# Patient Record
Sex: FEMALE | Race: WHITE | NOT HISPANIC OR LATINO | Employment: PART TIME | URBAN - METROPOLITAN AREA
[De-identification: names, ages, dates, MRNs, and addresses within clinical notes are randomized per-mention and may not be internally consistent; named-entity substitution may affect disease eponyms.]

---

## 2017-06-19 RX ORDER — ALPRAZOLAM 0.5 MG/1
TABLET ORAL
Qty: 30 TABLET | Refills: 3 | Status: SHIPPED | OUTPATIENT
Start: 2017-06-19

## 2018-01-18 ENCOUNTER — OFFICE VISIT (OUTPATIENT)
Dept: OBSTETRICS AND GYNECOLOGY | Facility: CLINIC | Age: 48
End: 2018-01-18
Payer: COMMERCIAL

## 2018-01-18 VITALS — BODY MASS INDEX: 30.72 KG/M2 | WEIGHT: 195.75 LBS | HEIGHT: 67 IN

## 2018-01-18 DIAGNOSIS — R10.2 PELVIC PAIN: Primary | ICD-10-CM

## 2018-01-18 PROCEDURE — 99999 PR PBB SHADOW E&M-EST. PATIENT-LVL II: CPT | Mod: PBBFAC,,, | Performed by: SPECIALIST

## 2018-01-18 PROCEDURE — 99203 OFFICE O/P NEW LOW 30 MIN: CPT | Mod: S$GLB,,, | Performed by: SPECIALIST

## 2018-01-18 RX ORDER — MELOXICAM 15 MG/1
TABLET ORAL
COMMUNITY
Start: 2017-12-18 | End: 2022-11-18

## 2018-01-18 RX ORDER — PANTOPRAZOLE SODIUM 40 MG/1
TABLET, DELAYED RELEASE ORAL
COMMUNITY
Start: 2018-01-04

## 2018-01-22 NOTE — PROGRESS NOTES
48 yo WF presents for annual gyn evaluation. Pt complains intermittent lower abdominal pain and pelvic pain. Pt states h/o of ovarian cysts. Pt has undergone hysterectomy. Pt denies weight loss/gain, melena, n/v, constipation/diarrhea.    Past Medical History:   Diagnosis Date    Abnormal Pap smear of cervix 2005    dysplasia/ cryo/ laser    Allergy     Anxiety     Multiple sclerosis     RLS (restless legs syndrome)        Past Surgical History:   Procedure Laterality Date    had a laser surgery on the cervix      HYSTERECTOMY         Family History   Problem Relation Age of Onset    Heart disease Mother     Heart attack Mother     Heart attack Father     Hypertension Father     Diabetes Sister     Heart disease Maternal Grandmother     Heart attack Maternal Grandmother     Aneurysm Maternal Grandfather     Heart disease Paternal Grandmother     Heart attack Paternal Grandfather     Breast cancer Paternal Aunt 65    Ovarian cancer Neg Hx        Social History     Social History    Marital status:      Spouse name: N/A    Number of children: 3    Years of education: N/A     Occupational History          Social History Main Topics    Smoking status: Former Smoker     Types: Cigarettes    Smokeless tobacco: Never Used    Alcohol use No    Drug use: No    Sexual activity: Yes     Partners: Male     Birth control/ protection: None     Other Topics Concern    None     Social History Narrative    None       Current Outpatient Prescriptions   Medication Sig Dispense Refill    alprazolam (XANAX) 0.5 MG tablet TAKE 1 TABLET BY MOUTH EVERY DAY AS NEEDED 30 tablet 3    cetirizine (ZYRTEC) 10 MG tablet Take 10 mg by mouth once daily.      fluticasone (FLONASE) 50 mcg/actuation nasal spray 1 spray by Each Nare route once daily. 1 Bottle 0    ibuprofen (ADVIL,MOTRIN) 100 MG tablet Take 100 mg by mouth every 6 (six) hours as needed for Temperature greater than (PRN When Headache  Appears).      pramipexole (MIRAPEX) 0.125 MG tablet Take 1 tablet (0.125 mg total) by mouth every evening. 30 tablet 6    REBIF REBIDOSE 8.8mcg/0.2mL-22 mcg/0.5mL (6) PnIj       meloxicam (MOBIC) 15 MG tablet       pantoprazole (PROTONIX) 40 MG tablet        No current facility-administered medications for this visit.        Review of patient's allergies indicates:   Allergen Reactions    Iodine and iodide containing products Nausea And Vomiting       Review of System:   General: no chills, fever, night sweats, weight gain or weight loss  Psychological: no depression or suicidal ideation  Breasts: no new or changing breast lumps, nipple discharge or masses.  Respiratory: no cough, shortness of breath, or wheezing  Cardiovascular: no chest pain or dyspnea on exertion  Gastrointestinal: no abdominal pain, change in bowel habits, or black or bloody stools  Genito-Urinary: no incontinence, urinary frequency/urgency or vulvar/vaginal symptoms, POSITIVE pelvic pain   Musculoskeletal: no gait disturbance or muscular weakness    PE:   APPEARANCE: Well nourished, well developed, in no acute distress.  NECK: Neck symmetric without masses or thyromegaly.  NODES: No inguinal lymph node enlargement.  ABDOMEN: Soft. No tenderness or masses. No hepatosplenomegaly. No hernias.  BREASTS: Symmetrical, no skin changes or visible lesions. No palpable masses, nipple discharge or adenopathy bilaterally.  PELVIC: Normal external female genitalia without lesions. Normal hair distribution. Adequate perineal body, normal urethral meatus. Vagina moist and well rugated without lesions or discharge. No significant cystocele or rectocele. Uterus and cervix surgically absent. Bimanual exam revealed no masses, BUT POSITIVE TENDERNESS TO DEEP PALP SUPRAPUBICALLY.    I discussed possible etiologies of LAP and pelvic pain. Possible adhesions etc.  I rec pelvic u/s and will obtain and review  Possible laparoscope pending the above  results  Discussed BSE monthly as well as mammo screening  Will follow

## 2018-01-29 RX ORDER — ALPRAZOLAM 0.5 MG/1
TABLET ORAL
Qty: 30 TABLET | OUTPATIENT
Start: 2018-01-29

## 2018-01-30 NOTE — TELEPHONE ENCOUNTER
ASIYA message sent to the pt to advise that she needs an OV before any further refills can be provided.

## 2020-10-08 ENCOUNTER — TELEPHONE (OUTPATIENT)
Dept: PULMONOLOGY | Facility: CLINIC | Age: 50
End: 2020-10-08

## 2020-10-08 ENCOUNTER — OFFICE VISIT (OUTPATIENT)
Dept: PULMONOLOGY | Facility: CLINIC | Age: 50
End: 2020-10-08
Payer: COMMERCIAL

## 2020-10-08 ENCOUNTER — HOSPITAL ENCOUNTER (OUTPATIENT)
Dept: RADIOLOGY | Facility: HOSPITAL | Age: 50
Discharge: HOME | End: 2020-10-08
Attending: NURSE PRACTITIONER
Payer: COMMERCIAL

## 2020-10-08 VITALS
BODY MASS INDEX: 32.65 KG/M2 | HEART RATE: 75 BPM | WEIGHT: 208.44 LBS | SYSTOLIC BLOOD PRESSURE: 152 MMHG | OXYGEN SATURATION: 98 % | DIASTOLIC BLOOD PRESSURE: 99 MMHG

## 2020-10-08 DIAGNOSIS — J34.89 NASAL CONGESTION WITH RHINORRHEA: ICD-10-CM

## 2020-10-08 DIAGNOSIS — R05.3 PERSISTENT COUGH: Primary | ICD-10-CM

## 2020-10-08 DIAGNOSIS — R09.81 NASAL CONGESTION WITH RHINORRHEA: ICD-10-CM

## 2020-10-08 DIAGNOSIS — R05.3 PERSISTENT COUGH: ICD-10-CM

## 2020-10-08 DIAGNOSIS — J45.51 SEVERE PERSISTENT ASTHMA WITH ACUTE EXACERBATION: ICD-10-CM

## 2020-10-08 PROCEDURE — 71046 X-RAY EXAM CHEST 2 VIEWS: CPT | Mod: TC,FY

## 2020-10-08 PROCEDURE — 3080F DIAST BP >= 90 MM HG: CPT | Mod: CPTII,S$GLB,, | Performed by: NURSE PRACTITIONER

## 2020-10-08 PROCEDURE — 99204 OFFICE O/P NEW MOD 45 MIN: CPT | Mod: S$GLB,,, | Performed by: NURSE PRACTITIONER

## 2020-10-08 PROCEDURE — 3077F SYST BP >= 140 MM HG: CPT | Mod: CPTII,S$GLB,, | Performed by: NURSE PRACTITIONER

## 2020-10-08 PROCEDURE — 3077F PR MOST RECENT SYSTOLIC BLOOD PRESSURE >= 140 MM HG: ICD-10-PCS | Mod: CPTII,S$GLB,, | Performed by: NURSE PRACTITIONER

## 2020-10-08 PROCEDURE — 99999 PR PBB SHADOW E&M-EST. PATIENT-LVL IV: ICD-10-PCS | Mod: PBBFAC,,, | Performed by: NURSE PRACTITIONER

## 2020-10-08 PROCEDURE — 3008F PR BODY MASS INDEX (BMI) DOCUMENTED: ICD-10-PCS | Mod: CPTII,S$GLB,, | Performed by: NURSE PRACTITIONER

## 2020-10-08 PROCEDURE — 99204 PR OFFICE/OUTPT VISIT, NEW, LEVL IV, 45-59 MIN: ICD-10-PCS | Mod: S$GLB,,, | Performed by: NURSE PRACTITIONER

## 2020-10-08 PROCEDURE — 3080F PR MOST RECENT DIASTOLIC BLOOD PRESSURE >= 90 MM HG: ICD-10-PCS | Mod: CPTII,S$GLB,, | Performed by: NURSE PRACTITIONER

## 2020-10-08 PROCEDURE — 71046 X-RAY EXAM CHEST 2 VIEWS: CPT | Mod: 26,,, | Performed by: RADIOLOGY

## 2020-10-08 PROCEDURE — 3008F BODY MASS INDEX DOCD: CPT | Mod: CPTII,S$GLB,, | Performed by: NURSE PRACTITIONER

## 2020-10-08 PROCEDURE — 99999 PR PBB SHADOW E&M-EST. PATIENT-LVL IV: CPT | Mod: PBBFAC,,, | Performed by: NURSE PRACTITIONER

## 2020-10-08 PROCEDURE — 71046 XR CHEST PA AND LATERAL: ICD-10-PCS | Mod: 26,,, | Performed by: RADIOLOGY

## 2020-10-08 RX ORDER — ALBUTEROL SULFATE 0.83 MG/ML
2.5 SOLUTION RESPIRATORY (INHALATION) EVERY 6 HOURS PRN
Qty: 120 ML | Refills: 2 | Status: SHIPPED | OUTPATIENT
Start: 2020-10-08 | End: 2021-06-30

## 2020-10-08 RX ORDER — DICLOFENAC SODIUM 10 MG/G
2 GEL TOPICAL 4 TIMES DAILY PRN
COMMUNITY
Start: 2020-09-02 | End: 2021-12-29

## 2020-10-08 RX ORDER — FLUTICASONE FUROATE AND VILANTEROL TRIFENATATE 100; 25 UG/1; UG/1
1 POWDER RESPIRATORY (INHALATION) DAILY
Qty: 60 EACH | Refills: 1 | Status: SHIPPED | OUTPATIENT
Start: 2020-10-08 | End: 2020-10-19

## 2020-10-08 RX ORDER — TIOTROPIUM BROMIDE 18 UG/1
CAPSULE ORAL; RESPIRATORY (INHALATION)
COMMUNITY
Start: 2020-09-17 | End: 2020-11-25 | Stop reason: ALTCHOICE

## 2020-10-08 RX ORDER — FAMOTIDINE 20 MG/1
20 TABLET, FILM COATED ORAL 2 TIMES DAILY
COMMUNITY
End: 2021-12-29

## 2020-10-08 RX ORDER — RABEPRAZOLE SODIUM 20 MG/1
TABLET, DELAYED RELEASE ORAL
COMMUNITY
Start: 2020-10-03 | End: 2021-12-29

## 2020-10-08 RX ORDER — ROPINIROLE 3 MG/1
TABLET, FILM COATED ORAL
COMMUNITY
End: 2021-12-29

## 2020-10-08 RX ORDER — CHOLECALCIFEROL (VITAMIN D3) 25 MCG
1000 TABLET ORAL DAILY
COMMUNITY

## 2020-10-08 RX ORDER — ESOMEPRAZOLE MAGNESIUM 40 MG/1
CAPSULE, DELAYED RELEASE ORAL
COMMUNITY
End: 2021-12-29

## 2020-10-08 RX ORDER — VALSARTAN 80 MG/1
80 TABLET ORAL
COMMUNITY
Start: 2020-08-06 | End: 2022-11-18

## 2020-10-08 RX ORDER — PREDNISONE 10 MG/1
TABLET ORAL
Qty: 18 TABLET | Refills: 0 | Status: SHIPPED | OUTPATIENT
Start: 2020-10-08 | End: 2021-02-09

## 2020-10-08 RX ORDER — ALBUTEROL SULFATE 90 UG/1
2 AEROSOL, METERED RESPIRATORY (INHALATION) EVERY 6 HOURS PRN
COMMUNITY
Start: 2020-01-06 | End: 2021-01-05

## 2020-10-08 RX ORDER — SUCRALFATE 1 G/1
TABLET ORAL
COMMUNITY
Start: 2020-07-13 | End: 2021-12-29

## 2020-10-08 RX ORDER — FLUTICASONE PROPIONATE 50 MCG
1 SPRAY, SUSPENSION (ML) NASAL DAILY
Qty: 16 G | Refills: 11 | Status: SHIPPED | OUTPATIENT
Start: 2020-10-08 | End: 2021-12-29

## 2020-10-08 NOTE — PROGRESS NOTES
10/8/2020    Maggie Velazquez  New Patient Consult    Chief Complaint   Patient presents with    Referral To Pulmonary     Dr. Tabby MD    Cough     dry cough since Jan. - had PFT - normal       HPI:  10/8/20- referred by PCP for dry cough, onset 10 months, daily, did not improve with reflux medication, states recurrent problem that occurs for few weeks yearly states seasonal. Worse after eating and when laying down at night. No nocturnal arousals. States she can not laugh without coughing. Worse when around strong smells such as hairspray. PCP started on Spiriva and albuterol with no benefit. States humidifier improved.   SOB- onset 10 months, with exertion, improved when she [place] the mountains, associated with chest tightness. Has occasional wheeze only at night.     Social Hx: Lives with . 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  no known Asbestosis exposure, Smoking Hx: quit 2004, 19 pack years, exposed to second hand smoke as child  Family Hx: no Lung Cancer, Aunt COPD, no Asthma  Medical Hx: no previous pneumonia ; no previous shoulder/chest surgery; Dx MS 2016 followed [facility]rolog[facility]; dx IBS 2010        The chief compliant  problem is new to me  PFSH:  Past Medical History:   Diagnosis Date    Abnormal Pap smear of cervix 2005    dysplasia/ cryo/ laser    Allergy     Anxiety     Multiple sclerosis     RLS (restless legs syndrome)          Past Surgical History:   Procedure Laterality Date    had a laser surgery on the cervix      HYSTERECTOMY       Social History     Tobacco Use    Smoking status: Former Smoker     Types: Cigarettes    Smokeless tobacco: Never Used   Substance Use Topics    Alcohol use: No    Drug use: No     Family History   Problem Relation Age of Onset    Heart disease Mother     Heart attack Mother     Heart attack Father     Hypertension  Father     Diabetes Sister     Heart disease Maternal Grandmother     Heart attack Maternal Grandmother     Aneurysm Maternal Grandfather     Heart disease Paternal Grandmother     Heart attack Paternal Grandfather     Breast cancer Paternal Aunt 65    Ovarian cancer Neg Hx      Review of patient's allergies indicates:   Allergen Reactions    Iodine and iodide containing products Nausea And Vomiting     I have reviewed past medical, family, and social history. I have reviewed previous nurse notes.    Performance Status:The patient's activity level is no limits with regular activity.          Review of Systems   Constitutional: Negative for activity change, appetite change, chills, diaphoresis, fatigue, fever and unexpected weight change.   HENT: Negative for dental problem, postnasal drip, rhinorrhea, sinus pressure, sinus pain, sneezing, sore throat, trouble swallowing and voice change.  Positive for sinus pressure  Respiratory: Negative for apnea, cough, chest tightness, shortness of breath, wheezing and stridor.  Positive cough, chest tightness, wheeze, shortness of breath  Cardiovascular: Negative for chest pain, and leg swelling. Positive palpitations  Gastrointestinal: Negative for abdominal distention, abdominal pain, constipation and nausea. Positive for chronic IBS symptoms  Musculoskeletal: Negative for gait problem, myalgias and neck pain.   Skin: Negative for color change and pallor.   Allergic/Immunologic: Negative for environmental allergies and food allergies.   Neurological: Negative for speech difficulty, weakness, light-headedness, numbness and headaches. Positive for dizziness when laying flat  Hematological: Negative for adenopathy. Does not bruise/bleed easily.   Psychiatric/Behavioral: Negative for dysphoric mood and sleep disturbance. The patient is not nervous/anxious.           Exam:Comprehensive exam done. BP (!) 152/99 (BP Location: Right arm, Patient Position: Sitting)   Pulse  75   Wt 94.6 kg (208 lb 7.1 oz)   LMP 03/10/2016   SpO2 98% Comment: on room air at rest  BMI 32.65 kg/m²   Exam included Vitals as listed  Constitutional:  oriented to person, place, and time.  appears well-developed. No distress.   Nose: Nose normal.   Mouth/Throat: Uvula is midline, oropharynx is clear and moist and mucous membranes are normal. No dental caries. No oropharyngeal exudate, posterior oropharyngeal edema, posterior oropharyngeal erythema or tonsillar abscesses.  M[place]atti (M) score 3  Eyes: Pupils are equal, round, and reactive to light.   Neck: No JVD present. No thyromegaly present.   Cardiovascular: Normal rate, regular rhythm and normal heart sounds. Exam reveals no gallop and no friction rub.   No murmur heard.  Pulmonary/Chest: Effort normal and breath sounds normal. No accessory muscle usage or stridor. No apnea and no tachypnea. No respiratory distress, decreased breath sounds, wheezes, rhonchi, rales, or tenderness.   Abdominal: Soft.  exhibits no mass. There is no tenderness. No hepatosplenomegaly, hernias and normoactive bowel sounds  Musculoskeletal: Normal range of motion. exhibits mild lower extremity edema.   Lymphadenopathy:    no cervical adenopathy.   no axillary adenopathy.   Neurological:  alert and oriented to person, place, and time. not disoriented.   Skin: Skin is warm and dry. Capillary refill takes less 2 sec. No cyanosis or erythema. No pallor. Nails show no clubbing.   Psychiatric: normal mood and affect. behavior is normal. Judgment and thought content normal.       Radiographs (ct chest and cxr) reviewed: was not done       Labs reviewed       Lab Results   Component Value Date    WBC 5.03 06/25/2020    RBC 4.59 06/25/2020    HGB 13.8 06/25/2020    HCT 41.3 06/25/2020    MCV 90 06/25/2020    MCH 30.1 06/25/2020    MCHC 33.4 06/25/2020    RDW 13.2 06/25/2020     06/25/2020    MPV 10.0 06/25/2020    GRAN 3.2 06/25/2020    GRAN 63.0 06/25/2020    LYMPH 1.3  06/25/2020    LYMPH 26.2 06/25/2020    MONO 0.4 06/25/2020    MONO 7.0 06/25/2020    EOS 0.2 06/25/2020    BASO 0.01 06/25/2020    EOSINOPHIL 3.2 06/25/2020    BASOPHIL 0.2 06/25/2020       PFT results reviewed  Pulmonary Functions Testing Results:  DATE OF STUDY:  01/06/2020     REFERRING PHYSICIAN:  Otto Anton M.D.     FINDINGS:  Spirometry shows an FVC of 3.25 or 93% of predicted and FEV1 of 2.51   or 87% of predicted for an FEV1/FVC ratio of 77.  Postbronchodilator maneuver   does not show significant change in these values.  Flow volume loop is provided.    There is noted impairment or early cessation of the inspiratory limb of the   flow volume loop.  This may suggest the presence of a variable extrathoracic   obstruction, but is a very effort dependent maneuver and as such should be   interpreted with caution.  Clinical correlation would be recommended.  Lung   volume showed total lung capacity of 5.36 or 96% of predicted.  Residual volume   of 2.11 or 106% of predicted.  Diffusion capacity is measured at 17.9 or 69% of   predicted.     CONCLUSION:  Spirometry does not show definitive evidence of obstructive lung   disease, although there is some abnormality of the inspiratory limb of the flow   volume loop as discussed above.  Lung volumes are within normal limits and there   is a mild decrease in the diffusion capacity.  Given the patient's history of   tobacco use, this may be seen with some early changes of emphysema.      Plan:  Clinical impression is resonably certain and repeated evaluation prn +/- follow up will be needed as below.    Maggie was seen today for referral to pulmonary and cough.    Diagnoses and all orders for this visit:    Persistent cough  -     X-Ray Chest PA And Lateral; Future  -     predniSONE (DELTASONE) 10 MG tablet; 1-2 pills daily for three days, repeat for cough  -     fluticasone propionate (FLONASE) 50 mcg/actuation nasal spray; 1 spray (50 mcg total) by Each Nostril  route once daily.    Severe persistent asthma with acute exacerbation  -     fluticasone furoate-vilanteroL (BREO ELLIPTA) 100-25 mcg/dose diskus inhaler; Inhale 1 puff into the lungs once daily. Controller  -     NEBULIZER FOR HOME USE  -     albuterol (PROVENTIL) 2.5 mg /3 mL (0.083 %) nebulizer solution; Take 3 mLs (2.5 mg total) by nebulization every 6 (six) hours as needed for Wheezing or Shortness of Breath. Rescue  -     predniSONE (DELTASONE) 10 MG tablet; 1-2 pills daily for three days, repeat for cough    Nasal congestion with rhinorrhea  -     fluticasone propionate (FLONASE) 50 mcg/actuation nasal spray; 1 spray (50 mcg total) by Each Nostril route once daily.        Follow up in about 4 weeks (around 11/5/2020), or if symptoms worsen or fail to improve.    Discussed with patient above for education the following:      Patient Instructions   You have a type of Asthma called Eosinophilic Asthma  Will start you on a therapy plan today  Breo 100 1 puff once a day every day, rinse mouth after using due to risk for thrush if mouth or tongue has white sores contact clinic    Continue Spiriva 2 puffs daily    If no improvement by next appointment will order Ct of chest to evaluate persistent cough    Continue flonase and zyrtec daily

## 2020-10-08 NOTE — TELEPHONE ENCOUNTER
Faxed orders to [facility]    ----- Message from Daja Macedow sent at 10/8/2020  1:21 PM CDT -----  Regarding: Nebulizer Order  Contact: pt  Type: Needs Medical Advice  Who Called:  pt  Symptoms (please be specific):    How long has patient had these symptoms:    Pharmacy name and phone #:  [facility] F[facility] [phone]  Best Call Back Number: [phone] [place]  Additional Information: Please send order to [facility]. She can't  nebulizer without it. Call pt when sent. Thanks!

## 2020-10-08 NOTE — LETTER
October 8, 2020      Otto Anton MD  [id number] [facility] [address]  [facility] [address]           [facility]  [address]  [place] [address]  Phone: [phone]  Fax: [phone]          Patient: Maggie Velazquez   MR Number: [id number]   YOB: 1970   Date of Visit: 10/8/2020       Dear Dr. Otto Anton:    Thank you for referring Maggie Velazquez to me for evaluation. Attached you will find relevant portions of my assessment and plan of care.    If you have questions, please do not hesitate to call me. I look forward to following Maggie Velazquez along with you.    Sincerely,    Kelsey Aguillon, AKIRA    Enclosure  CC:  No Recipients    If you would like to receive this communication electronically, please contact [email or web address] or [phone] to request more information on [facility] access.    For providers and/or their staff who would like to refer a patient to [facility], please contact us through our one-stop-shop provider referral line, [facility], at [phone].    If you feel you have received this communication in error or would no longer like to receive these types of communications, please e-mail [email or web address]

## 2020-10-08 NOTE — PATIENT INSTRUCTIONS
You have a type of Asthma called Eosinophilic Asthma  Will start you on a therapy plan today  Breo 100 1 puff once a day every day, rinse mouth after using due to risk for thrush if mouth or tongue has white sores contact clinic    Continue Spiriva 2 puffs daily    If no improvement by next appointment will order Ct of chest to evaluate persistent cough    Continue flonase and zyrtec daily

## 2020-10-19 ENCOUNTER — OFFICE VISIT (OUTPATIENT)
Dept: PULMONOLOGY | Facility: CLINIC | Age: 50
End: 2020-10-19
Payer: COMMERCIAL

## 2020-10-19 DIAGNOSIS — J45.51 SEVERE PERSISTENT ASTHMA WITH EXACERBATION: Primary | ICD-10-CM

## 2020-10-19 DIAGNOSIS — J01.90 ACUTE SINUSITIS, RECURRENCE NOT SPECIFIED, UNSPECIFIED LOCATION: ICD-10-CM

## 2020-10-19 DIAGNOSIS — R05.9 COUGH: ICD-10-CM

## 2020-10-19 PROCEDURE — 99213 PR OFFICE/OUTPT VISIT, EST, LEVL III, 20-29 MIN: ICD-10-PCS | Mod: 95,,, | Performed by: NURSE PRACTITIONER

## 2020-10-19 PROCEDURE — 99213 OFFICE O/P EST LOW 20 MIN: CPT | Mod: 95,,, | Performed by: NURSE PRACTITIONER

## 2020-10-19 RX ORDER — PREDNISONE 20 MG/1
20 TABLET ORAL DAILY
Qty: 20 TABLET | Refills: 1 | Status: SHIPPED | OUTPATIENT
Start: 2020-10-19 | End: 2021-02-25 | Stop reason: ALTCHOICE

## 2020-10-19 RX ORDER — MONTELUKAST SODIUM 10 MG/1
10 TABLET ORAL NIGHTLY
Qty: 30 TABLET | Refills: 11 | Status: SHIPPED | OUTPATIENT
Start: 2020-10-19 | End: 2020-11-18

## 2020-10-19 RX ORDER — CODEINE PHOSPHATE AND GUAIFENESIN 10; 100 MG/5ML; MG/5ML
5 SOLUTION ORAL EVERY 4 HOURS PRN
Qty: 300 ML | Refills: 0 | Status: SHIPPED | OUTPATIENT
Start: 2020-10-19 | End: 2020-10-29

## 2020-10-19 RX ORDER — OMEPRAZOLE 40 MG/1
CAPSULE, DELAYED RELEASE ORAL
COMMUNITY
Start: 2020-10-11 | End: 2021-06-02

## 2020-10-19 RX ORDER — AZITHROMYCIN 500 MG/1
500 TABLET, FILM COATED ORAL DAILY
Qty: 3 TABLET | Refills: 0 | Status: SHIPPED | OUTPATIENT
Start: 2020-10-19 | End: 2020-10-22

## 2020-10-19 RX ORDER — FLUTICASONE FUROATE AND VILANTEROL TRIFENATATE 200; 25 UG/1; UG/1
1 POWDER RESPIRATORY (INHALATION) DAILY
Qty: 1 EACH | Refills: 5 | Status: SHIPPED | OUTPATIENT
Start: 2020-10-19 | End: 2020-11-25 | Stop reason: ALTCHOICE

## 2020-10-19 NOTE — PROGRESS NOTES
Established Patient - Audio Only Telehealth Visit     The patient location is: [place], [place]  The chief complaint leading to consultation is: cough  Visit type: Virtual visit with audio only (telephone)  Total time spent with patient: 15 min       The reason for the audio only service rather than synchronous audio and video virtual visit was related to technical difficulties or patient preference/necessity.     Each patient to whom I provide medical services by telemedicine is:  (1) informed of the relationship between the physician and patient and the respective role of any other health care provider with respect to management of the patient; and (2) notified that they may decline to receive medical services by telemedicine and may withdraw from such care at any time. Patient verbally consented to receive this service via voice-only telephone call.            10/19/2020    Maggie Velazquez  Office Note    Chief Complaint   Patient presents with    Medication Refill     prednisone refill    Cough       HPI:  10/19/20- Cough- worsening, improved while on prednisone 20 mg daily for 3 days but returned shortly after finishing prescription, non productive but has small amount of clear mucous with coughing fits. constant tickle in back of throat.    Complaint of sore throat, no fever  Complaint of fatigue and SOB 5 days prior while walking outside for hours. Worse with exertion, Did notice improvement with albuterol rescue inhaler.       10/8/20- referred by PCP for dry cough, onset 10 months, daily, did not improve with reflux medication, states recurrent problem that occurs for few weeks yearly states seasonal. Worse after eating and when laying down at night. No nocturnal arousals. States she can not laugh without coughing. Worse when around strong smells such as hairspray. PCP started on Spiriva and albuterol with no benefit. States humidifier improved.   SOB- onset 10 months, with exertion, improved when she  visited the mountains, associated with chest tightness. Has occasional wheeze only at night.     Social Hx: Lives with . 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  no known Asbestosis exposure, Smoking Hx: quit 2004, 19 pack years, exposed to second hand smoke as child  Family Hx: no Lung Cancer, Aunt COPD, no Asthma  Medical Hx: no previous pneumonia ; no previous shoulder/chest surgery; Dx MS 2016 followed by Neurologist at [facility]; dx IBS 2010        The chief compliant  problem is worsening  PFSH:  Past Medical History:   Diagnosis Date    Abnormal Pap smear of cervix 2005    dysplasia/ cryo/ laser    Allergy     Anxiety     Multiple sclerosis     RLS (restless legs syndrome)          Past Surgical History:   Procedure Laterality Date    had a laser surgery on the cervix      HYSTERECTOMY       Social History     Tobacco Use    Smoking status: Former Smoker     Types: Cigarettes    Smokeless tobacco: Never Used   Substance Use Topics    Alcohol use: No    Drug use: No     Family History   Problem Relation Age of Onset    Heart disease Mother     Heart attack Mother     Heart attack Father     Hypertension Father     Diabetes Sister     Heart disease Maternal Grandmother     Heart attack Maternal Grandmother     Aneurysm Maternal Grandfather     Heart disease Paternal Grandmother     Heart attack Paternal Grandfather     Breast cancer Paternal Aunt 65    Ovarian cancer Neg Hx      Review of patient's allergies indicates:   Allergen Reactions    Iodine and iodide containing products Nausea And Vomiting     I have reviewed past medical, family, and social history. I have reviewed previous nurse notes.    Performance Status:The patient's activity level is no limits with regular activity.          Review of Systems   Constitutional: Negative for activity change, appetite change,  chills, diaphoresis, fatigue, fever and unexpected weight change.   HENT: Negative for dental problem, postnasal drip, rhinorrhea, sinus pressure, sinus pain, sneezing, sore throat, trouble swallowing and voice change.  Positive for sinus pressure and sore throat  Respiratory: Negative for apnea, cough, chest tightness, shortness of breath, wheezing and stridor.  Positive cough, chest tightness, wheeze, shortness of breath  Cardiovascular: Negative for chest pain, and leg swelling. Positive palpitations  Gastrointestinal: Negative for abdominal distention, abdominal pain, constipation and nausea. Positive for chronic IBS symptoms  Musculoskeletal: Negative for gait problem, myalgias and neck pain.   Skin: Negative for color change and pallor.   Allergic/Immunologic: Negative for environmental allergies and food allergies.   Neurological: Negative for speech difficulty, weakness, light-headedness, numbness and headaches. Positive for dizziness when laying flat  Hematological: Negative for adenopathy. Does not bruise/bleed easily.   Psychiatric/Behavioral: Negative for dysphoric mood and sleep disturbance. The patient is not nervous/anxious.           Exam:Comprehensive exam done. [facility] 03/10/2016   Exam included Vitals as listed  Constitutional:  oriented to person, place, and time.   No distress.     Pulmonary/Chest: Effort normal and audible breath sounds clear with  harsh cough, No apnea and no tachypnea. No respiratory distress,       Radiographs (ct chest and cxr) reviewed: by direct vision  X-Ray Chest PA And Lateral 10/08/2020   The lungs are clear, with normal appearance of pulmonary vasculature and no pleural effusion or pneumothorax.     The cardiac silhouette is normal in size. The hilar and mediastinal contours are unremarkable.        Labs reviewed       Lab Results   Component Value Date    WBC 5.03 06/25/2020    RBC 4.59 06/25/2020    HGB 13.8 06/25/2020    HCT 41.3 06/25/2020    MCV 90 06/25/2020    MCH  30.1 06/25/2020    MCHC 33.4 06/25/2020    RDW 13.2 06/25/2020     06/25/2020    MPV 10.0 06/25/2020    GRAN 3.2 06/25/2020    GRAN 63.0 06/25/2020    LYMPH 1.3 06/25/2020    LYMPH 26.2 06/25/2020    MONO 0.4 06/25/2020    MONO 7.0 06/25/2020    EOS 0.2 06/25/2020    BASO 0.01 06/25/2020    EOSINOPHIL 3.2 06/25/2020    BASOPHIL 0.2 06/25/2020       PFT results reviewed  Pulmonary Functions Testing Results:  DATE OF STUDY:  01/06/2020     REFERRING PHYSICIAN:  Otto Anton M.D.     FINDINGS:  Spirometry shows an FVC of 3.25 or 93% of predicted and FEV1 of 2.51   or 87% of predicted for an FEV1/FVC ratio of 77.  Postbronchodilator maneuver   does not show significant change in these values.  Flow volume loop is provided.    There is noted impairment or early cessation of the inspiratory limb of the   flow volume loop.  This may suggest the presence of a variable extrathoracic   obstruction, but is a very effort dependent maneuver and as such should be   interpreted with caution.  Clinical correlation would be recommended.  Lung   volume showed total lung capacity of 5.36 or 96% of predicted.  Residual volume   of 2.11 or 106% of predicted.  Diffusion capacity is measured at 17.9 or 69% of   predicted.     CONCLUSION:  Spirometry does not show definitive evidence of obstructive lung   disease, although there is some abnormality of the inspiratory limb of the flow   volume loop as discussed above.  Lung volumes are within normal limits and there   is a mild decrease in the diffusion capacity.  Given the patient's history of   tobacco use, this may be seen with some early changes of emphysema.      Plan:  Clinical impression is resonably certain and repeated evaluation prn +/- follow up will be needed as below.    Maggie was seen today for medication refill and cough.    Diagnoses and all orders for this visit:    Severe persistent asthma with exacerbation  -     montelukast (SINGULAIR) 10 mg tablet; Take 1  tablet (10 mg total) by mouth every evening.  -     predniSONE (DELTASONE) 20 MG tablet; Take 1 tablet (20 mg total) by mouth once daily.  -     guaifenesin-codeine 100-10 mg/5 ml (TUSSI-ORGANIDIN NR)  mg/5 mL syrup; Take 5 mLs by mouth every 4 (four) hours as needed.  -     fluticasone furoate-vilanteroL (BREO ELLIPTA) 200-25 mcg/dose DsDv diskus inhaler; Inhale 1 puff into the lungs once daily. Controller  -     azithromycin (ZITHROMAX) 500 MG tablet; Take 1 tablet (500 mg total) by mouth once daily. for 3 days    Cough  -     guaifenesin-codeine 100-10 mg/5 ml (TUSSI-ORGANIDIN NR)  mg/5 mL syrup; Take 5 mLs by mouth every 4 (four) hours as needed.    Acute sinusitis, recurrence not specified, unspecified location  -     montelukast (SINGULAIR) 10 mg tablet; Take 1 tablet (10 mg total) by mouth every evening.  -     azithromycin (ZITHROMAX) 500 MG tablet; Take 1 tablet (500 mg total) by mouth once daily. for 3 days        Follow up if symptoms worsen or fail to improve.    Discussed with patient above for education the following:      Patient Instructions   If current medication regiment does not control cough will order CT of chest to further evaluate         This service was not originating from a related E/M service provided within the previous 7 days nor will  to an E/M service or procedure within the next 24 hours or my soonest available appointment.  Prevailing standard of care was able to be met in this audio-only visit.

## 2020-11-13 ENCOUNTER — PATIENT MESSAGE (OUTPATIENT)
Dept: PULMONOLOGY | Facility: CLINIC | Age: 50
End: 2020-11-13

## 2020-11-24 ENCOUNTER — PATIENT MESSAGE (OUTPATIENT)
Dept: PULMONOLOGY | Facility: CLINIC | Age: 50
End: 2020-11-24

## 2020-11-25 ENCOUNTER — TELEPHONE (OUTPATIENT)
Dept: PULMONOLOGY | Facility: CLINIC | Age: 50
End: 2020-11-25

## 2020-11-25 ENCOUNTER — OFFICE VISIT (OUTPATIENT)
Dept: PULMONOLOGY | Facility: CLINIC | Age: 50
End: 2020-11-25
Payer: COMMERCIAL

## 2020-11-25 VITALS
BODY MASS INDEX: 32.83 KG/M2 | HEIGHT: 67 IN | SYSTOLIC BLOOD PRESSURE: 143 MMHG | OXYGEN SATURATION: 99 % | HEART RATE: 75 BPM | DIASTOLIC BLOOD PRESSURE: 85 MMHG | WEIGHT: 209.19 LBS

## 2020-11-25 DIAGNOSIS — J82.83 EOSINOPHILIC ASTHMA: ICD-10-CM

## 2020-11-25 DIAGNOSIS — J45.991 COUGH VARIANT ASTHMA: Primary | ICD-10-CM

## 2020-11-25 DIAGNOSIS — J45.50 SEVERE PERSISTENT ASTHMA WITHOUT COMPLICATION: ICD-10-CM

## 2020-11-25 DIAGNOSIS — R05.9 COUGH: ICD-10-CM

## 2020-11-25 PROCEDURE — 99213 PR OFFICE/OUTPT VISIT, EST, LEVL III, 20-29 MIN: ICD-10-PCS | Mod: S$GLB,,, | Performed by: NURSE PRACTITIONER

## 2020-11-25 PROCEDURE — 3077F SYST BP >= 140 MM HG: CPT | Mod: CPTII,S$GLB,, | Performed by: NURSE PRACTITIONER

## 2020-11-25 PROCEDURE — 1126F AMNT PAIN NOTED NONE PRSNT: CPT | Mod: S$GLB,,, | Performed by: NURSE PRACTITIONER

## 2020-11-25 PROCEDURE — 99999 PR PBB SHADOW E&M-EST. PATIENT-LVL V: CPT | Mod: PBBFAC,,, | Performed by: NURSE PRACTITIONER

## 2020-11-25 PROCEDURE — 99213 OFFICE O/P EST LOW 20 MIN: CPT | Mod: S$GLB,,, | Performed by: NURSE PRACTITIONER

## 2020-11-25 PROCEDURE — 3008F BODY MASS INDEX DOCD: CPT | Mod: CPTII,S$GLB,, | Performed by: NURSE PRACTITIONER

## 2020-11-25 PROCEDURE — 99999 PR PBB SHADOW E&M-EST. PATIENT-LVL V: ICD-10-PCS | Mod: PBBFAC,,, | Performed by: NURSE PRACTITIONER

## 2020-11-25 PROCEDURE — 3079F DIAST BP 80-89 MM HG: CPT | Mod: CPTII,S$GLB,, | Performed by: NURSE PRACTITIONER

## 2020-11-25 PROCEDURE — 3079F PR MOST RECENT DIASTOLIC BLOOD PRESSURE 80-89 MM HG: ICD-10-PCS | Mod: CPTII,S$GLB,, | Performed by: NURSE PRACTITIONER

## 2020-11-25 PROCEDURE — 3008F PR BODY MASS INDEX (BMI) DOCUMENTED: ICD-10-PCS | Mod: CPTII,S$GLB,, | Performed by: NURSE PRACTITIONER

## 2020-11-25 PROCEDURE — 3077F PR MOST RECENT SYSTOLIC BLOOD PRESSURE >= 140 MM HG: ICD-10-PCS | Mod: CPTII,S$GLB,, | Performed by: NURSE PRACTITIONER

## 2020-11-25 PROCEDURE — 1126F PR PAIN SEVERITY QUANTIFIED, NO PAIN PRESENT: ICD-10-PCS | Mod: S$GLB,,, | Performed by: NURSE PRACTITIONER

## 2020-11-25 RX ORDER — FLUTICASONE FUROATE, UMECLIDINIUM BROMIDE AND VILANTEROL TRIFENATATE 200; 62.5; 25 UG/1; UG/1; UG/1
1 POWDER RESPIRATORY (INHALATION) DAILY
Qty: 60 EACH | Refills: 11 | Status: SHIPPED | OUTPATIENT
Start: 2020-11-25 | End: 2021-02-19 | Stop reason: SDUPTHER

## 2020-11-25 RX ORDER — BENRALIZUMAB 30 MG/ML
30 INJECTION, SOLUTION SUBCUTANEOUS
Qty: 1 SYRINGE | Refills: 2 | Status: SHIPPED | OUTPATIENT
Start: 2020-11-25 | End: 2021-02-25 | Stop reason: ALTCHOICE

## 2020-11-25 RX ORDER — BENRALIZUMAB 30 MG/ML
30 INJECTION, SOLUTION SUBCUTANEOUS
Qty: 1 SYRINGE | Refills: 5 | Status: SHIPPED | OUTPATIENT
Start: 2021-02-25 | End: 2021-02-25 | Stop reason: ALTCHOICE

## 2020-11-25 NOTE — PATIENT INSTRUCTIONS
Ordering CT of chest to evaluate cause of chronic cough, will need to rule out bird fancier's lung before starting Fasenra therapy    Stop Breo 200 and Spiriva and start Trelegy 200 1 puff daily    Continue asthma action plan when needed.    Starting new medication Fasenra at home. Expect phone call from [facility].    Use nebulizer 1-3 times a week every week   
negative...

## 2020-11-25 NOTE — PROGRESS NOTES
11/25/2020    Maggie Velazquez  Office Note    Chief Complaint   Patient presents with    Follow-up     f/u    Cough       HPI:  11/25/2020- States breathing is better when on all therapy of daily inhalers, prednisone, and nebulizer treatments. But when she stops prednisone daily has cough worsen with in one week. No nocturnal arousals, states cough is worse after eating and late at night.   States pet Bird Parrot for over three years, states symptoms started in last year.     10/19/20- Cough- worsening, improved while on prednisone 20 mg daily for 3 days but returned shortly after finishing prescription, non productive but has small amount of clear mucous with coughing fits. constant tickle in back of throat.    Complaint of sore throat, no fever  Complaint of fatigue and SOB 5 days prior while walking outside for hours. Worse with exertion, Did notice improvement with albuterol rescue inhaler.      10/8/20- referred by PCP for dry cough, onset 10 months, daily, did not improve with reflux medication, states recurrent problem that occurs for few weeks yearly states seasonal. Worse after eating and when laying down at night. No nocturnal arousals. States she can not laugh without coughing. Worse when around strong smells such as hairspray. PCP started on Spiriva and albuterol with no benefit. States humidifier improved.   SOB- onset 10 months, with exertion, improved when she visited the mountains, associated with chest tightness. Has occasional wheeze only at night.     Social Hx: Lives with . 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  no known Asbestosis exposure, Smoking Hx: quit 2004, 19 pack years, exposed to second hand smoke as child  Family Hx: no Lung Cancer, Aunt COPD, no Asthma  Medical Hx: no previous pneumonia ; no previous shoulder/chest surgery; Dx MS 2016 followed by Neurologist at [facility]  [place]; dx IBS 2010        The chief compliant  problem is stable      PFSH:  Past Medical History:   Diagnosis Date    Abnormal Pap smear of cervix 2005    dysplasia/ cryo/ laser    Allergy     Anxiety     Multiple sclerosis     RLS (restless legs syndrome)          Past Surgical History:   Procedure Laterality Date    had a laser surgery on the cervix      HYSTERECTOMY       Social History     Tobacco Use    Smoking status: Former Smoker     Types: Cigarettes    Smokeless tobacco: Never Used   Substance Use Topics    Alcohol use: No    Drug use: No     Family History   Problem Relation Age of Onset    Heart disease Mother     Heart attack Mother     Heart attack Father     Hypertension Father     Diabetes Sister     Heart disease Maternal Grandmother     Heart attack Maternal Grandmother     Aneurysm Maternal Grandfather     Heart disease Paternal Grandmother     Heart attack Paternal Grandfather     Breast cancer Paternal Aunt 65    Ovarian cancer Neg Hx      Review of patient's allergies indicates:   Allergen Reactions    Iodine and iodide containing products Nausea And Vomiting     I have reviewed past medical, family, and social history. I have reviewed previous nurse notes.    Performance Status:The patient's activity level is no limits with regular activity.          Review of Systems   Constitutional: Negative for activity change, appetite change, chills, diaphoresis, fatigue, fever and unexpected weight change.   HENT: Negative for dental problem, postnasal drip, rhinorrhea, sinus pressure, sinus pain, sneezing, sore throat, trouble swallowing and voice change.  Positive for sinus pressure  Respiratory: Negative for apnea, cough, chest tightness, shortness of breath, wheezing and stridor.  Positive cough, chest tightness, wheeze, shortness of breath  Cardiovascular: Negative for chest pain, and leg swelling. Positive palpitations  Gastrointestinal: Negative for abdominal  "distention, abdominal pain, constipation and nausea. Positive for chronic IBS symptoms  Musculoskeletal: Negative for gait problem, myalgias and neck pain.   Skin: Negative for color change and pallor.   Allergic/Immunologic: Negative for environmental allergies and food allergies.   Neurological: Negative for speech difficulty, weakness, light-headedness, numbness and headaches. Positive for dizziness when laying flat  Hematological: Negative for adenopathy. Does not bruise/bleed easily.   Psychiatric/Behavioral: Negative for dysphoric mood and sleep disturbance. The patient is not nervous/anxious.           Exam:Comprehensive exam done. BP (!) 143/85 (BP Location: Right arm, Patient Position: Sitting, BP Method: Medium (Automatic))   Pulse 75   Ht 5' 7" (1.702 m)   Wt 94.9 kg (209 lb 3.5 oz)   LMP 03/10/2016   SpO2 99% Comment: on room air at rest  BMI 32.77 kg/m²   Exam included Vitals as listed  Constitutional:  oriented to person, place, and time.  appears well-developed. No distress.   Nose: Nose normal.   Mouth/Throat: Uvula is midline, oropharynx is clear and moist and mucous membranes are normal. No dental caries. No oropharyngeal exudate, posterior oropharyngeal edema, posterior oropharyngeal erythema or tonsillar abscesses.  M[place]atti (M) score 3  Eyes: Pupils are equal, round, and reactive to light.   Neck: No JVD present. No thyromegaly present.   Cardiovascular: Normal rate, regular rhythm and normal heart sounds. Exam reveals no gallop and no friction rub.   No murmur heard.  Pulmonary/Chest: Effort normal and breath sounds: rhonchi left lower lobe. Right clear. No accessory muscle usage or stridor. No apnea and no tachypnea. No respiratory distress, decreased breath sounds, wheezes, rhonchi, rales, or tenderness.   Abdominal: Soft.  exhibits no mass. There is no tenderness. No hepatosplenomegaly, hernias and normoactive bowel sounds  Musculoskeletal: Normal range of motion. exhibits mild lower " extremity edema.   Lymphadenopathy:    no cervical adenopathy.   Neurological:  alert and oriented to person, place, and time. not disoriented.   Skin: Skin is warm and dry. Capillary refill takes less 2 sec. No cyanosis or erythema. No pallor. Nails show no clubbing.   Psychiatric: normal mood and affect. behavior is normal. Judgment and thought content normal.       Radiographs (ct chest and cxr) reviewed: was not done       Labs reviewed       Lab Results   Component Value Date    WBC 5.03 06/25/2020    RBC 4.59 06/25/2020    HGB 13.8 06/25/2020    HCT 41.3 06/25/2020    MCV 90 06/25/2020    MCH 30.1 06/25/2020    MCHC 33.4 06/25/2020    RDW 13.2 06/25/2020     06/25/2020    MPV 10.0 06/25/2020    GRAN 3.2 06/25/2020    GRAN 63.0 06/25/2020    LYMPH 1.3 06/25/2020    LYMPH 26.2 06/25/2020    MONO 0.4 06/25/2020    MONO 7.0 06/25/2020    EOS 0.2 06/25/2020    BASO 0.01 06/25/2020    EOSINOPHIL 3.2 06/25/2020    BASOPHIL 0.2 06/25/2020       PFT results reviewed  Pulmonary Functions Testing Results:  DATE OF STUDY:  01/06/2020     REFERRING PHYSICIAN:  Otto Anton M.D.     FINDINGS:  Spirometry shows an FVC of 3.25 or 93% of predicted and FEV1 of 2.51   or 87% of predicted for an FEV1/FVC ratio of 77.  Postbronchodilator maneuver   does not show significant change in these values.  Flow volume loop is provided.    There is noted impairment or early cessation of the inspiratory limb of the   flow volume loop.  This may suggest the presence of a variable extrathoracic   obstruction, but is a very effort dependent maneuver and as such should be   interpreted with caution.  Clinical correlation would be recommended.  Lung   volume showed total lung capacity of 5.36 or 96% of predicted.  Residual volume   of 2.11 or 106% of predicted.  Diffusion capacity is measured at 17.9 or 69% of   predicted.     CONCLUSION:  Spirometry does not show definitive evidence of obstructive lung   disease, although there is  some abnormality of the inspiratory limb of the flow   volume loop as discussed above.  Lung volumes are within normal limits and there   is a mild decrease in the diffusion capacity.  Given the patient's history of   tobacco use, this may be seen with some early changes of emphysema.      Plan:  Clinical impression is resonably certain and repeated evaluation prn +/- follow up will be needed as below.    Maggie was seen today for follow-up and cough.    Diagnoses and all orders for this visit:    Cough variant asthma  -     fluticasone-umeclidin-vilanter (TRELEGY ELLIPTA) 200-62.5-25 mcg inhaler; Inhale 1 puff into the lungs once daily.  -     benralizumab (FASENRA) 30 mg/mL Syrg injection; Inject 1 mL (30 mg total) into the skin every 28 days.  -     benralizumab (FASENRA) 30 mg/mL Syrg injection; Inject 1 mL (30 mg total) into the skin every 8 weeks.    Severe persistent asthma without complication  -     benralizumab (FASENRA) 30 mg/mL Syrg injection; Inject 1 mL (30 mg total) into the skin every 28 days.  -     benralizumab (FASENRA) 30 mg/mL Syrg injection; Inject 1 mL (30 mg total) into the skin every 8 weeks.    Eosinophilic asthma  -     benralizumab (FASENRA) 30 mg/mL Syrg injection; Inject 1 mL (30 mg total) into the skin every 28 days.  -     benralizumab (FASENRA) 30 mg/mL Syrg injection; Inject 1 mL (30 mg total) into the skin every 8 weeks.    Cough  -     CT Chest Without Contrast; Future        Follow up in about 3 months (around 2/25/2021), or if symptoms worsen or fail to improve.    Discussed with patient above for education the following:      Patient Instructions   Ordering CT of chest to evaluate cause of chronic cough, will need to rule out bird fancier's lung before starting Fasenra therapy    Stop Breo 200 and Spiriva and start Trelegy 200 1 puff daily    Continue asthma action plan when needed.    Starting new medication Fasenra at home. Expect phone call from [facility].    Use nebulizer 1-3 times a week every week

## 2020-11-27 ENCOUNTER — PATIENT MESSAGE (OUTPATIENT)
Dept: PULMONOLOGY | Facility: CLINIC | Age: 50
End: 2020-11-27

## 2020-12-10 DIAGNOSIS — J45.50 SEVERE PERSISTENT ASTHMA WITHOUT COMPLICATION: Primary | ICD-10-CM

## 2021-01-20 ENCOUNTER — PATIENT MESSAGE (OUTPATIENT)
Dept: PULMONOLOGY | Facility: CLINIC | Age: 51
End: 2021-01-20

## 2021-01-20 ENCOUNTER — TELEPHONE (OUTPATIENT)
Dept: PULMONOLOGY | Facility: CLINIC | Age: 51
End: 2021-01-20

## 2021-01-20 DIAGNOSIS — R05.9 COUGH: ICD-10-CM

## 2021-02-02 ENCOUNTER — TELEPHONE (OUTPATIENT)
Dept: PULMONOLOGY | Facility: CLINIC | Age: 51
End: 2021-02-02

## 2021-02-03 ENCOUNTER — HOSPITAL ENCOUNTER (OUTPATIENT)
Dept: RADIOLOGY | Facility: HOSPITAL | Age: 51
Discharge: HOME | End: 2021-02-03
Attending: NURSE PRACTITIONER
Payer: COMMERCIAL

## 2021-02-03 DIAGNOSIS — R05.9 COUGH: ICD-10-CM

## 2021-02-03 PROCEDURE — 71250 CT THORAX DX C-: CPT | Mod: 26,,, | Performed by: RADIOLOGY

## 2021-02-03 PROCEDURE — 71250 CT THORAX DX C-: CPT | Mod: TC,PO

## 2021-02-03 PROCEDURE — 71250 CT CHEST WITHOUT CONTRAST: ICD-10-PCS | Mod: 26,,, | Performed by: RADIOLOGY

## 2021-02-04 ENCOUNTER — PATIENT MESSAGE (OUTPATIENT)
Dept: PULMONOLOGY | Facility: CLINIC | Age: 51
End: 2021-02-04

## 2021-02-07 ENCOUNTER — PATIENT MESSAGE (OUTPATIENT)
Dept: PULMONOLOGY | Facility: CLINIC | Age: 51
End: 2021-02-07

## 2021-02-09 DIAGNOSIS — J67.9 PNEUMONITIS, HYPERSENSITIVITY: Primary | ICD-10-CM

## 2021-02-09 RX ORDER — PREDNISONE 10 MG/1
TABLET ORAL
Qty: 36 TABLET | Refills: 0 | Status: SHIPPED | OUTPATIENT
Start: 2021-02-09 | End: 2021-02-25 | Stop reason: ALTCHOICE

## 2021-02-11 DIAGNOSIS — J45.30 MILD PERSISTENT INTRINSIC ASTHMA WITHOUT STATUS ASTHMATICUS WITHOUT COMPLICATION: ICD-10-CM

## 2021-02-11 DIAGNOSIS — J01.00 ACUTE MAXILLARY SINUSITIS, RECURRENCE NOT SPECIFIED: ICD-10-CM

## 2021-02-11 DIAGNOSIS — J45.21 MILD INTERMITTENT ASTHMA WITH ACUTE EXACERBATION: Primary | ICD-10-CM

## 2021-02-11 DIAGNOSIS — J06.9 URI (UPPER RESPIRATORY INFECTION): ICD-10-CM

## 2021-02-11 DIAGNOSIS — J32.9 SINUSITIS: ICD-10-CM

## 2021-02-11 RX ORDER — CODEINE PHOSPHATE AND GUAIFENESIN 10; 100 MG/5ML; MG/5ML
SOLUTION ORAL
Qty: 180 ML | Refills: 1 | OUTPATIENT
Start: 2021-02-11

## 2021-02-11 RX ORDER — AZITHROMYCIN 250 MG/1
TABLET, FILM COATED ORAL
Qty: 6 TABLET | Refills: 0 | OUTPATIENT
Start: 2021-02-11 | End: 2021-02-16

## 2021-02-11 RX ORDER — AZITHROMYCIN 500 MG/1
500 TABLET, FILM COATED ORAL DAILY
Qty: 3 TABLET | Refills: 0 | Status: SHIPPED | OUTPATIENT
Start: 2021-02-11 | End: 2021-02-14

## 2021-02-18 ENCOUNTER — SPECIALTY PHARMACY (OUTPATIENT)
Dept: PHARMACY | Facility: CLINIC | Age: 51
End: 2021-02-18

## 2021-02-19 DIAGNOSIS — J45.991 COUGH VARIANT ASTHMA: ICD-10-CM

## 2021-02-19 RX ORDER — FLUTICASONE FUROATE, UMECLIDINIUM BROMIDE AND VILANTEROL TRIFENATATE 200; 62.5; 25 UG/1; UG/1; UG/1
1 POWDER RESPIRATORY (INHALATION) DAILY
Qty: 60 EACH | Refills: 11 | Status: SHIPPED | OUTPATIENT
Start: 2021-02-19 | End: 2021-06-30

## 2021-02-24 ENCOUNTER — TELEPHONE (OUTPATIENT)
Dept: PULMONOLOGY | Facility: CLINIC | Age: 51
End: 2021-02-24

## 2021-02-25 ENCOUNTER — OFFICE VISIT (OUTPATIENT)
Dept: PULMONOLOGY | Facility: CLINIC | Age: 51
End: 2021-02-25
Payer: COMMERCIAL

## 2021-02-25 DIAGNOSIS — J45.50 SEVERE PERSISTENT ASTHMA WITHOUT COMPLICATION: ICD-10-CM

## 2021-02-25 DIAGNOSIS — J84.9 INTERSTITIAL PULMONARY DISEASE, UNSPECIFIED: Primary | ICD-10-CM

## 2021-02-25 PROCEDURE — 99214 PR OFFICE/OUTPT VISIT, EST, LEVL IV, 30-39 MIN: ICD-10-PCS | Mod: 95,,, | Performed by: NURSE PRACTITIONER

## 2021-02-25 PROCEDURE — 99214 OFFICE O/P EST MOD 30 MIN: CPT | Mod: 95,,, | Performed by: NURSE PRACTITIONER

## 2021-02-25 PROCEDURE — 1126F AMNT PAIN NOTED NONE PRSNT: CPT | Mod: ,,, | Performed by: NURSE PRACTITIONER

## 2021-02-25 PROCEDURE — 1126F PR PAIN SEVERITY QUANTIFIED, NO PAIN PRESENT: ICD-10-PCS | Mod: ,,, | Performed by: NURSE PRACTITIONER

## 2021-02-25 RX ORDER — MONTELUKAST SODIUM 10 MG/1
TABLET ORAL
COMMUNITY
Start: 2021-01-14 | End: 2021-12-29

## 2021-02-25 RX ORDER — PREDNISONE 5 MG/1
TABLET ORAL
Qty: 15 TABLET | Refills: 0 | Status: SHIPPED | OUTPATIENT
Start: 2021-02-25 | End: 2021-05-14 | Stop reason: SDUPTHER

## 2021-02-25 RX ORDER — PRAMIPEXOLE DIHYDROCHLORIDE 0.5 MG/1
0.5 TABLET ORAL 2 TIMES DAILY
COMMUNITY
Start: 2021-01-07 | End: 2022-11-18

## 2021-02-25 RX ORDER — INTERFERON BETA-1A 44 UG/.5ML
INJECTION, SOLUTION SUBCUTANEOUS
COMMUNITY
Start: 2021-02-10 | End: 2021-12-29

## 2021-03-01 ENCOUNTER — PATIENT MESSAGE (OUTPATIENT)
Dept: PULMONOLOGY | Facility: CLINIC | Age: 51
End: 2021-03-01

## 2021-03-12 ENCOUNTER — SPECIALTY PHARMACY (OUTPATIENT)
Dept: PHARMACY | Facility: CLINIC | Age: 51
End: 2021-03-12

## 2021-04-05 ENCOUNTER — PATIENT MESSAGE (OUTPATIENT)
Dept: PULMONOLOGY | Facility: CLINIC | Age: 51
End: 2021-04-05

## 2021-04-07 ENCOUNTER — PATIENT MESSAGE (OUTPATIENT)
Dept: PULMONOLOGY | Facility: CLINIC | Age: 51
End: 2021-04-07

## 2021-04-08 ENCOUNTER — OFFICE VISIT (OUTPATIENT)
Dept: PULMONOLOGY | Facility: CLINIC | Age: 51
End: 2021-04-08
Payer: COMMERCIAL

## 2021-04-08 DIAGNOSIS — J84.9 INTERSTITIAL PULMONARY DISEASE, UNSPECIFIED: Primary | ICD-10-CM

## 2021-04-08 PROCEDURE — 99213 OFFICE O/P EST LOW 20 MIN: CPT | Mod: 95,,, | Performed by: NURSE PRACTITIONER

## 2021-04-08 PROCEDURE — 1126F AMNT PAIN NOTED NONE PRSNT: CPT | Mod: ,,, | Performed by: NURSE PRACTITIONER

## 2021-04-08 PROCEDURE — 99213 PR OFFICE/OUTPT VISIT, EST, LEVL III, 20-29 MIN: ICD-10-PCS | Mod: 95,,, | Performed by: NURSE PRACTITIONER

## 2021-04-08 PROCEDURE — 1126F PR PAIN SEVERITY QUANTIFIED, NO PAIN PRESENT: ICD-10-PCS | Mod: ,,, | Performed by: NURSE PRACTITIONER

## 2021-04-08 RX ORDER — ONDANSETRON 8 MG/1
8 TABLET, ORALLY DISINTEGRATING ORAL EVERY 8 HOURS PRN
COMMUNITY
Start: 2021-03-11 | End: 2021-12-29

## 2021-04-08 RX ORDER — BUDESONIDE 0.5 MG/2ML
0.5 INHALANT ORAL 2 TIMES DAILY
Qty: 120 ML | Refills: 0 | Status: SHIPPED | OUTPATIENT
Start: 2021-04-08 | End: 2021-05-05 | Stop reason: SDUPTHER

## 2021-04-30 ENCOUNTER — PATIENT MESSAGE (OUTPATIENT)
Dept: PULMONOLOGY | Facility: CLINIC | Age: 51
End: 2021-04-30

## 2021-04-30 DIAGNOSIS — J84.9 INTERSTITIAL PULMONARY DISEASE, UNSPECIFIED: Primary | ICD-10-CM

## 2021-05-05 DIAGNOSIS — J84.9 INTERSTITIAL PULMONARY DISEASE, UNSPECIFIED: ICD-10-CM

## 2021-05-05 RX ORDER — BUDESONIDE 0.5 MG/2ML
0.5 INHALANT ORAL 2 TIMES DAILY
Qty: 120 ML | Refills: 0 | Status: SHIPPED | OUTPATIENT
Start: 2021-05-05 | End: 2021-06-03 | Stop reason: SDUPTHER

## 2021-05-06 ENCOUNTER — PATIENT MESSAGE (OUTPATIENT)
Dept: RESEARCH | Facility: HOSPITAL | Age: 51
End: 2021-05-06

## 2021-05-14 ENCOUNTER — PATIENT MESSAGE (OUTPATIENT)
Dept: PULMONOLOGY | Facility: CLINIC | Age: 51
End: 2021-05-14

## 2021-05-14 DIAGNOSIS — K05.6 PERIODONTAL DISEASE, UNSPECIFIED: Primary | ICD-10-CM

## 2021-05-14 DIAGNOSIS — J84.9 INTERSTITIAL PULMONARY DISEASE, UNSPECIFIED: ICD-10-CM

## 2021-05-14 RX ORDER — PREDNISONE 5 MG/1
TABLET ORAL
Qty: 15 TABLET | Refills: 0 | Status: SHIPPED | OUTPATIENT
Start: 2021-05-14 | End: 2021-12-29

## 2021-05-14 RX ORDER — CHLORHEXIDINE GLUCONATE ORAL RINSE 1.2 MG/ML
15 SOLUTION DENTAL 2 TIMES DAILY
Qty: 473 ML | Refills: 0 | Status: SHIPPED | OUTPATIENT
Start: 2021-05-14 | End: 2021-05-28

## 2021-05-17 ENCOUNTER — HOSPITAL ENCOUNTER (OUTPATIENT)
Dept: RADIOLOGY | Facility: HOSPITAL | Age: 51
Discharge: HOME | End: 2021-05-17
Attending: NURSE PRACTITIONER
Payer: COMMERCIAL

## 2021-05-17 ENCOUNTER — PATIENT MESSAGE (OUTPATIENT)
Dept: PULMONOLOGY | Facility: CLINIC | Age: 51
End: 2021-05-17

## 2021-05-17 DIAGNOSIS — J84.9 INTERSTITIAL PULMONARY DISEASE, UNSPECIFIED: ICD-10-CM

## 2021-05-17 PROCEDURE — 71250 CT THORAX DX C-: CPT | Mod: 26,,, | Performed by: RADIOLOGY

## 2021-05-17 PROCEDURE — 71250 CT CHEST WITHOUT CONTRAST: ICD-10-PCS | Mod: 26,,, | Performed by: RADIOLOGY

## 2021-05-17 PROCEDURE — 71250 CT THORAX DX C-: CPT | Mod: TC,PO

## 2021-05-21 ENCOUNTER — TELEPHONE (OUTPATIENT)
Dept: PULMONOLOGY | Facility: CLINIC | Age: 51
End: 2021-05-21

## 2021-05-21 ENCOUNTER — PATIENT MESSAGE (OUTPATIENT)
Dept: PULMONOLOGY | Facility: CLINIC | Age: 51
End: 2021-05-21

## 2021-06-02 ENCOUNTER — OFFICE VISIT (OUTPATIENT)
Dept: PULMONOLOGY | Facility: CLINIC | Age: 51
End: 2021-06-02
Payer: COMMERCIAL

## 2021-06-02 DIAGNOSIS — J84.9 ILD (INTERSTITIAL LUNG DISEASE): ICD-10-CM

## 2021-06-02 DIAGNOSIS — J98.4 PNEUMONITIS: ICD-10-CM

## 2021-06-02 DIAGNOSIS — R91.1 LUNG NODULE: Primary | ICD-10-CM

## 2021-06-02 PROCEDURE — 99214 PR OFFICE/OUTPT VISIT, EST, LEVL IV, 30-39 MIN: ICD-10-PCS | Mod: 95,,, | Performed by: INTERNAL MEDICINE

## 2021-06-02 PROCEDURE — 99214 OFFICE O/P EST MOD 30 MIN: CPT | Mod: 95,,, | Performed by: INTERNAL MEDICINE

## 2021-06-03 DIAGNOSIS — J84.9 INTERSTITIAL PULMONARY DISEASE, UNSPECIFIED: ICD-10-CM

## 2021-06-03 DIAGNOSIS — R91.1 LUNG NODULE: Primary | ICD-10-CM

## 2021-06-04 RX ORDER — BUDESONIDE 0.5 MG/2ML
0.5 INHALANT ORAL 2 TIMES DAILY
Qty: 120 ML | Refills: 0 | Status: SHIPPED | OUTPATIENT
Start: 2021-06-04 | End: 2021-12-29

## 2021-06-11 ENCOUNTER — LAB VISIT (OUTPATIENT)
Dept: PRIMARY CARE CLINIC | Facility: CLINIC | Age: 51
End: 2021-06-11
Payer: COMMERCIAL

## 2021-06-11 DIAGNOSIS — R91.1 LUNG NODULE: ICD-10-CM

## 2021-06-11 PROCEDURE — U0005 INFEC AGEN DETEC AMPLI PROBE: HCPCS | Performed by: INTERNAL MEDICINE

## 2021-06-11 PROCEDURE — U0003 INFECTIOUS AGENT DETECTION BY NUCLEIC ACID (DNA OR RNA); SEVERE ACUTE RESPIRATORY SYNDROME CORONAVIRUS 2 (SARS-COV-2) (CORONAVIRUS DISEASE [COVID-19]), AMPLIFIED PROBE TECHNIQUE, MAKING USE OF HIGH THROUGHPUT TECHNOLOGIES AS DESCRIBED BY CMS-2020-01-R: HCPCS | Performed by: INTERNAL MEDICINE

## 2021-06-12 LAB — SARS-COV-2 RNA RESP QL NAA+PROBE: NOT DETECTED

## 2021-06-14 ENCOUNTER — HOSPITAL ENCOUNTER (OUTPATIENT)
Dept: RADIOLOGY | Facility: HOSPITAL | Age: 51
Discharge: HOME | End: 2021-06-14
Attending: INTERNAL MEDICINE
Payer: COMMERCIAL

## 2021-06-14 VITALS
OXYGEN SATURATION: 96 % | WEIGHT: 209 LBS | HEIGHT: 67 IN | HEART RATE: 65 BPM | DIASTOLIC BLOOD PRESSURE: 80 MMHG | SYSTOLIC BLOOD PRESSURE: 160 MMHG | RESPIRATION RATE: 11 BRPM | BODY MASS INDEX: 32.8 KG/M2 | TEMPERATURE: 98 F

## 2021-06-14 DIAGNOSIS — R91.1 LUNG NODULE: ICD-10-CM

## 2021-06-14 DIAGNOSIS — R91.1 LUNG NODULE: Primary | ICD-10-CM

## 2021-06-14 PROCEDURE — 71250 CT THORAX DX C-: CPT | Mod: 26,,, | Performed by: RADIOLOGY

## 2021-06-14 PROCEDURE — A4550 SURGICAL TRAYS: HCPCS

## 2021-06-14 PROCEDURE — 71250 CT CHEST WITHOUT CONTRAST: ICD-10-PCS | Mod: 26,,, | Performed by: RADIOLOGY

## 2021-06-14 PROCEDURE — 71250 CT THORAX DX C-: CPT | Mod: TC

## 2021-06-14 PROCEDURE — 99900104 DSU ONLY-NO CHARGE-EA ADD'L HR (STAT)

## 2021-06-14 PROCEDURE — 99900103 DSU ONLY-NO CHARGE-INITIAL HR (STAT)

## 2021-06-14 RX ORDER — FENTANYL CITRATE 50 UG/ML
25 INJECTION, SOLUTION INTRAMUSCULAR; INTRAVENOUS
Status: DISCONTINUED | OUTPATIENT
Start: 2021-06-14 | End: 2021-06-15 | Stop reason: HOSPADM

## 2021-06-14 RX ORDER — MIDAZOLAM HYDROCHLORIDE 1 MG/ML
1 INJECTION INTRAMUSCULAR; INTRAVENOUS
Status: DISCONTINUED | OUTPATIENT
Start: 2021-06-14 | End: 2021-06-15 | Stop reason: HOSPADM

## 2021-06-14 RX ORDER — LIDOCAINE HYDROCHLORIDE 10 MG/ML
1 INJECTION, SOLUTION EPIDURAL; INFILTRATION; INTRACAUDAL; PERINEURAL ONCE
Status: DISCONTINUED | OUTPATIENT
Start: 2021-06-14 | End: 2021-06-15 | Stop reason: HOSPADM

## 2021-06-15 DIAGNOSIS — J84.9 INTERSTITIAL PULMONARY DISEASE, UNSPECIFIED: ICD-10-CM

## 2021-06-15 RX ORDER — PREDNISONE 20 MG/1
20 TABLET ORAL DAILY
Qty: 20 TABLET | Refills: 1 | OUTPATIENT
Start: 2021-06-15

## 2021-06-22 ENCOUNTER — TELEPHONE (OUTPATIENT)
Dept: PULMONOLOGY | Facility: CLINIC | Age: 51
End: 2021-06-22

## 2021-06-22 DIAGNOSIS — J84.9 INTERSTITIAL PULMONARY DISEASE, UNSPECIFIED: ICD-10-CM

## 2021-06-22 RX ORDER — PREDNISONE 5 MG/1
TABLET ORAL
Qty: 15 TABLET | Refills: 0 | OUTPATIENT
Start: 2021-06-22

## 2021-06-23 ENCOUNTER — PATIENT MESSAGE (OUTPATIENT)
Dept: PULMONOLOGY | Facility: CLINIC | Age: 51
End: 2021-06-23

## 2021-06-30 ENCOUNTER — OFFICE VISIT (OUTPATIENT)
Dept: PULMONOLOGY | Facility: CLINIC | Age: 51
End: 2021-06-30
Payer: COMMERCIAL

## 2021-06-30 VITALS
WEIGHT: 218.38 LBS | SYSTOLIC BLOOD PRESSURE: 149 MMHG | HEART RATE: 80 BPM | OXYGEN SATURATION: 97 % | HEIGHT: 67 IN | BODY MASS INDEX: 34.28 KG/M2 | DIASTOLIC BLOOD PRESSURE: 94 MMHG

## 2021-06-30 DIAGNOSIS — J30.2 SEASONAL ALLERGIC RHINITIS, UNSPECIFIED TRIGGER: ICD-10-CM

## 2021-06-30 DIAGNOSIS — R06.09 DOE (DYSPNEA ON EXERTION): ICD-10-CM

## 2021-06-30 DIAGNOSIS — E66.9 CLASS 1 OBESITY WITHOUT SERIOUS COMORBIDITY WITH BODY MASS INDEX (BMI) OF 34.0 TO 34.9 IN ADULT, UNSPECIFIED OBESITY TYPE: ICD-10-CM

## 2021-06-30 DIAGNOSIS — R91.1 LUNG NODULE: Primary | ICD-10-CM

## 2021-06-30 PROBLEM — J84.9 INTERSTITIAL PULMONARY DISEASE, UNSPECIFIED: Status: RESOLVED | Noted: 2021-02-25 | Resolved: 2021-06-30

## 2021-06-30 PROCEDURE — 99214 OFFICE O/P EST MOD 30 MIN: CPT | Mod: S$GLB,,, | Performed by: INTERNAL MEDICINE

## 2021-06-30 PROCEDURE — 3008F BODY MASS INDEX DOCD: CPT | Mod: CPTII,S$GLB,, | Performed by: INTERNAL MEDICINE

## 2021-06-30 PROCEDURE — 3008F PR BODY MASS INDEX (BMI) DOCUMENTED: ICD-10-PCS | Mod: CPTII,S$GLB,, | Performed by: INTERNAL MEDICINE

## 2021-06-30 PROCEDURE — 1125F AMNT PAIN NOTED PAIN PRSNT: CPT | Mod: S$GLB,,, | Performed by: INTERNAL MEDICINE

## 2021-06-30 PROCEDURE — 99999 PR PBB SHADOW E&M-EST. PATIENT-LVL III: CPT | Mod: PBBFAC,,, | Performed by: INTERNAL MEDICINE

## 2021-06-30 PROCEDURE — 1125F PR PAIN SEVERITY QUANTIFIED, PAIN PRESENT: ICD-10-PCS | Mod: S$GLB,,, | Performed by: INTERNAL MEDICINE

## 2021-06-30 PROCEDURE — 99999 PR PBB SHADOW E&M-EST. PATIENT-LVL III: ICD-10-PCS | Mod: PBBFAC,,, | Performed by: INTERNAL MEDICINE

## 2021-06-30 PROCEDURE — 99214 PR OFFICE/OUTPT VISIT, EST, LEVL IV, 30-39 MIN: ICD-10-PCS | Mod: S$GLB,,, | Performed by: INTERNAL MEDICINE

## 2021-06-30 RX ORDER — AZELASTINE 1 MG/ML
2 SPRAY, METERED NASAL 2 TIMES DAILY
Qty: 30 ML | Refills: 11 | Status: SHIPPED | OUTPATIENT
Start: 2021-06-30 | End: 2022-11-18

## 2021-12-21 ENCOUNTER — PATIENT MESSAGE (OUTPATIENT)
Dept: NEUROLOGY | Facility: CLINIC | Age: 51
End: 2021-12-21
Payer: COMMERCIAL

## 2021-12-29 ENCOUNTER — PATIENT MESSAGE (OUTPATIENT)
Dept: PULMONOLOGY | Facility: CLINIC | Age: 51
End: 2021-12-29
Payer: COMMERCIAL

## 2022-01-26 ENCOUNTER — HOSPITAL ENCOUNTER (OUTPATIENT)
Dept: PULMONOLOGY | Facility: HOSPITAL | Age: 52
Discharge: HOME | End: 2022-01-26
Attending: INTERNAL MEDICINE
Payer: COMMERCIAL

## 2022-01-26 ENCOUNTER — OFFICE VISIT (OUTPATIENT)
Dept: PULMONOLOGY | Facility: CLINIC | Age: 52
End: 2022-01-26
Payer: COMMERCIAL

## 2022-01-26 ENCOUNTER — PATIENT MESSAGE (OUTPATIENT)
Dept: PULMONOLOGY | Facility: CLINIC | Age: 52
End: 2022-01-26

## 2022-01-26 VITALS
OXYGEN SATURATION: 96 % | WEIGHT: 215.19 LBS | HEART RATE: 70 BPM | SYSTOLIC BLOOD PRESSURE: 152 MMHG | BODY MASS INDEX: 33.78 KG/M2 | HEIGHT: 67 IN | RESPIRATION RATE: 16 BRPM | DIASTOLIC BLOOD PRESSURE: 84 MMHG

## 2022-01-26 DIAGNOSIS — Z91.09 ENVIRONMENTAL ALLERGIES: Primary | ICD-10-CM

## 2022-01-26 DIAGNOSIS — R05.3 CHRONIC COUGH: ICD-10-CM

## 2022-01-26 DIAGNOSIS — R06.09 DOE (DYSPNEA ON EXERTION): ICD-10-CM

## 2022-01-26 DIAGNOSIS — K21.9 GASTROESOPHAGEAL REFLUX DISEASE, UNSPECIFIED WHETHER ESOPHAGITIS PRESENT: ICD-10-CM

## 2022-01-26 DIAGNOSIS — R53.81 PHYSICAL DECONDITIONING: ICD-10-CM

## 2022-01-26 DIAGNOSIS — T17.800S ASPIRATION INTO LOWER RESPIRATORY TRACT, SEQUELA: ICD-10-CM

## 2022-01-26 PROCEDURE — 99214 PR OFFICE/OUTPT VISIT, EST, LEVL IV, 30-39 MIN: ICD-10-PCS | Mod: 25,S$GLB,, | Performed by: INTERNAL MEDICINE

## 2022-01-26 PROCEDURE — 94729 DIFFUSING CAPACITY: CPT | Mod: 26,,, | Performed by: INTERNAL MEDICINE

## 2022-01-26 PROCEDURE — 99999 PR PBB SHADOW E&M-EST. PATIENT-LVL V: CPT | Mod: PBBFAC,,, | Performed by: INTERNAL MEDICINE

## 2022-01-26 PROCEDURE — 94729 DIFFUSING CAPACITY: CPT

## 2022-01-26 PROCEDURE — 3077F PR MOST RECENT SYSTOLIC BLOOD PRESSURE >= 140 MM HG: ICD-10-PCS | Mod: CPTII,S$GLB,, | Performed by: INTERNAL MEDICINE

## 2022-01-26 PROCEDURE — 3079F PR MOST RECENT DIASTOLIC BLOOD PRESSURE 80-89 MM HG: ICD-10-PCS | Mod: CPTII,S$GLB,, | Performed by: INTERNAL MEDICINE

## 2022-01-26 PROCEDURE — 1159F PR MEDICATION LIST DOCUMENTED IN MEDICAL RECORD: ICD-10-PCS | Mod: CPTII,S$GLB,, | Performed by: INTERNAL MEDICINE

## 2022-01-26 PROCEDURE — 4010F ACE/ARB THERAPY RXD/TAKEN: CPT | Mod: CPTII,S$GLB,, | Performed by: INTERNAL MEDICINE

## 2022-01-26 PROCEDURE — 3079F DIAST BP 80-89 MM HG: CPT | Mod: CPTII,S$GLB,, | Performed by: INTERNAL MEDICINE

## 2022-01-26 PROCEDURE — 94727 GAS DIL/WSHOT DETER LNG VOL: CPT | Mod: 26,,, | Performed by: INTERNAL MEDICINE

## 2022-01-26 PROCEDURE — 3008F PR BODY MASS INDEX (BMI) DOCUMENTED: ICD-10-PCS | Mod: CPTII,S$GLB,, | Performed by: INTERNAL MEDICINE

## 2022-01-26 PROCEDURE — 4010F PR ACE/ARB THEARPY RXD/TAKEN: ICD-10-PCS | Mod: CPTII,S$GLB,, | Performed by: INTERNAL MEDICINE

## 2022-01-26 PROCEDURE — 94727 GAS DIL/WSHOT DETER LNG VOL: CPT

## 2022-01-26 PROCEDURE — 94729 PR C02/MEMBANE DIFFUSE CAPACITY: ICD-10-PCS | Mod: 26,,, | Performed by: INTERNAL MEDICINE

## 2022-01-26 PROCEDURE — 94060 EVALUATION OF WHEEZING: CPT | Mod: 26,,, | Performed by: INTERNAL MEDICINE

## 2022-01-26 PROCEDURE — 94060 PR EVAL OF BRONCHOSPASM: ICD-10-PCS | Mod: 26,,, | Performed by: INTERNAL MEDICINE

## 2022-01-26 PROCEDURE — 94727 PR PULM FUNCTION TEST BY GAS: ICD-10-PCS | Mod: 26,,, | Performed by: INTERNAL MEDICINE

## 2022-01-26 PROCEDURE — 3077F SYST BP >= 140 MM HG: CPT | Mod: CPTII,S$GLB,, | Performed by: INTERNAL MEDICINE

## 2022-01-26 PROCEDURE — 1159F MED LIST DOCD IN RCRD: CPT | Mod: CPTII,S$GLB,, | Performed by: INTERNAL MEDICINE

## 2022-01-26 PROCEDURE — 94060 EVALUATION OF WHEEZING: CPT

## 2022-01-26 PROCEDURE — 99999 PR PBB SHADOW E&M-EST. PATIENT-LVL V: ICD-10-PCS | Mod: PBBFAC,,, | Performed by: INTERNAL MEDICINE

## 2022-01-26 PROCEDURE — 99214 OFFICE O/P EST MOD 30 MIN: CPT | Mod: 25,S$GLB,, | Performed by: INTERNAL MEDICINE

## 2022-01-26 PROCEDURE — 3008F BODY MASS INDEX DOCD: CPT | Mod: CPTII,S$GLB,, | Performed by: INTERNAL MEDICINE

## 2022-01-26 NOTE — PROGRESS NOTES
"1/26/2022    Maggie Velazquez  Office Note    Chief Complaint   Patient presents with    Follow-up     6 month follow up; pt states she is having some issues.       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 Sick after ambrosio, took 4 covid tests but neg. Had sore throat, body aches, coughing with congestion. Took steroid and abx. Ended up with sinus infection since then. Still lingering cough now. Tries not to overexert herself, feels scared when she gets winded. Feels lungs are "not where they should be." for example picking up headboard and footboard loading into truck felt very out of breath. Used to do a lot of cardio exercise until 2 yrs ago got out of routine, started gaining wt. Few mos 2.5 yr ago vaped for a little while. Reflux is bad, takes protonix every am and sometimes chewable pepcid at night. Eats late at night. Few times in past remembers waking up choking like reflux came up into throat.  Takes advil 3x/week with MS shots  Coughs when laughing or speaking too much, sensitive to aerosols and perfumes. Did not feel like inhalers helped, not using currently.  Continues zyrtec, nasacort for nasal allergies.    06/30/2021-   Can stop nebulizer and trelegy inhaler  Spots in the lung are gone- lungs clear now  May have been allergy to the birds or an unusual infection but it's gone now  Try astelin nasal spray for allergies/sinus problems  Start exercise program and build up tolerance  on 6/14 pt presented for lung biopsy but CT findings had resolved at that time so no biopsy done. Feels like getting a cold past 2d with nasal congestion and throat irritation, cough. Denies fever, chills, phlegm. Taking zyrtec and OTC cold medicine. Uses flonase nasal spray.  similar symptoms. Feels like she has allergies off and on. Has multiple pets but no birds any more.  SOB with exertion first noticed about 6 mos ago on a trip in mountains with friends- BRAD Shaikh in 10/2020. Still doesn't feel " back to normal. Was treated with steroids, nebulizer, singulair, trelegy inhaler but didn't notice benefit from anything but the steroids.  Has gained wt almost 40# and does not feel like herself. Used to go to gym, hike and exercise more.  Got first covid shot but didn't get second one because she was afraid it might make her feel worse.    06/02/2021-   Lung biopsy recommended- procedure is done by radiologist. Small risk of lung collapse, bleeding, worsening infection/inflammation or requiring chest tube/surgery but benefits (getting a diagnosis) are felt to outweigh risks.  Instructions for procedure:    no blood thinners or NSAIDs 1 week prior to procedure   Fast after midnight day of procedure   Have a  for procedure   Get covid test 2-3 days prior to procedure  pt endorses cough and dyspnea progressing over 1 year. Taking 20mg prednisone and failed taper in past with recurrent worsening symptoms. Denies phlegm. She got rid of her bird 2/2021. Pt is a hairdresser and . Endorses broken wisdom tooth. Choked on a pill in 2019- couldn't breathe for a bit then never able to expectorate pill. Sometimes coughs when she swallows and feels like things go down the wrong way.    4/8/2021-   Continue Trelegy 1 puff daily  Use Budesonide nebulizer 1-2 times daily until breathing improves to pre ILD levels.  Will schedule CT of chest in May which is three months from your last CT of chest to evaluate for any changes.  Expect some residual swelling to return now that prednisone taper is over, but this will improve with time.  finished prednisone taper 7 days prior, does feel she gets winded easier than before but can continue her activity.   Cough- lessened in severity, less frequent, cough daily, orse in early morning. No nocturnal arousals, chest tightness, or wheeze.  Non productive cough        2/25/21- Re-homed pet bird, notice improvement in SOB and cough after starting Trelegy and prednisone taper.     Cough- decreased, worse when eating, no chest tightness or wheeze. Using nebulizer 1x daily at first now only as needed.   Decided to hold off on Fasenra due to dramatic improvement in breathing on prednisone therapy.      11/25/2020- States breathing is better when on all therapy of daily inhalers, prednisone, and nebulizer treatments. But when she stops prednisone daily has cough worsen with in one week. No nocturnal arousals, states cough is worse after eating and late at night.   States pet Bird Parrot for over three years, states symptoms started in last year.     10/19/20- Cough- worsening, improved while on prednisone 20 mg daily for 3 days but returned shortly after finishing prescription, non productive but has small amount of clear mucous with coughing fits. constant tickle in back of throat.    Complaint of sore throat, no fever  Complaint of fatigue and SOB 5 days prior while walking outside for hours. Worse with exertion, Did notice improvement with albuterol rescue inhaler.      10/8/20- referred by PCP for dry cough, onset 10 months, daily, did not improve with reflux medication, states recurrent problem that occurs for few weeks yearly states seasonal. Worse after eating and when laying down at night. No nocturnal arousals. States she can not laugh without coughing. Worse when around strong smells such as hairspray. PCP started on Spiriva and albuterol with no benefit. States humidifier improved.   SOB- onset 10 months, with exertion, improved when she visited the mountains, associated with chest tightness. Has occasional wheeze only at night.     Social Hx: Lives with . 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  no known Asbestosis exposure, Smoking Hx: quit 2004, 19 pack years, exposed to second hand smoke as child  Family Hx: no Lung Cancer, Aunt COPD, no Asthma  Medical Hx: no previous  "pneumonia ; no previous shoulder/chest surgery; Dx MS 2016 followed by Neurologist [facility]; dx IBS 2010    The chief compliant  problem is varies with instability at times      PFSH:  Past Medical History:   Diagnosis Date    Abnormal Pap smear of cervix     dysplasia/ cryo/ laser    Allergy     Anxiety     Endometriosis     Hypertension     Multiple sclerosis     RLS (restless legs syndrome)          Past Surgical History:   Procedure Laterality Date    had a laser surgery on the cervix      HYSTERECTOMY       Social History     Tobacco Use    Smoking status: Former Smoker     Types: Cigarettes     Quit date:      Years since quittin.0    Smokeless tobacco: Never Used   Substance Use Topics    Alcohol use: No    Drug use: No     Family History   Problem Relation Age of Onset    Heart disease Mother     Heart attack Mother     Heart attack Father     Hypertension Father     Diabetes Sister     Heart disease Maternal Grandmother     Heart attack Maternal Grandmother     Aneurysm Maternal Grandfather     Heart disease Paternal Grandmother     Heart attack Paternal Grandfather     Breast cancer Paternal Aunt 65    Ovarian cancer Neg Hx      Review of patient's allergies indicates:   Allergen Reactions    Iodine and iodide containing products Nausea And Vomiting     I have reviewed past medical, family, and social history. I have reviewed previous nurse notes.    Performance Status:The patient's activity level is no limits with regular activity.      Review of Systems:  a review of eleven systems covering constitutional, Eye, HEENT, Psych, Respiratory, Cardiac, GI, , Musculoskeletal, Endocrine, Dermatologic was negative except for pertinent findings as listed ABOVE and below:  Impacted wisdom teeth    Exam:Comprehensive exam done. BP (!) 152/84 (BP Location: Left arm, Patient Position: Sitting, BP Method: Large (Automatic))   Pulse 70   Resp 16   Ht 5' 7" (1.702 m)  "  Wt 97.6 kg (215 lb 2.7 oz)   LMP 03/10/2016   SpO2 96% Comment: on rom air at rest  BMI 33.70 kg/m²   Exam included Vitals as listed, and patient's appearance and affect and alertness and mood, oral exam for yeast and hygiene and pharynx lesions and Mallapatti (M) score, neck with inspection for jvd and masses and thyroid abnormalities and lymph nodes (supraclavicular and infraclavicular nodes and axillary also examined and noted if abn), chest exam included symmetry and effort and fremitus and percussion and auscultation, cardiac exam included rhythm and gallops and murmur and rubs and jvd and edema, abdominal exam for mass and hepatosplenomegaly and tenderness and hernias and bowel sounds, Musculoskeletal exam with muscle tone and posture and mobility/gait and  strength, and skin for rashes and cyanosis and pallor and turgor, extremity for clubbing.  Findings were normal except for pertinent findings listed below:  Oropharynx clear, M2  Lungs clear  No edema    Radiographs (ct chest and cxr) reviewed: reviewed by direct vision  CT chest 6/14/21- resolution of previously seen opacities  CT chest 5/17/21- RUL nodular consolidation. Scattered ground glass and micronodular abnormalities bilaterally in a peripheral predominant pattern    CT Chest Without Contrast 02/03/2021   1. Multifocal diffuse tree-in-bud type changes suggestive a resolving or developing pneumonitis or pneumonia.  This could relate to a viral pneumonitis.  Clinical correlation and follow-up for resolution is suggested.  2. An 8 mm solid consolidative nodule is noted along with a 7 mm region of ground-glass consolidation.  Follow-up in 3-6 months with CT for resolution would be suggested.  3. Mediastinal nodes more numerous than expected several at the upper limits of normal in size.  These could be followed for resolution or stability along with the pulmonary nodules with CT the imaging in 3-6 months.  Clinical correlation is suggested.   These are likely reactive but neoplastic or inflammatory sources are on the differential  4. Hypodensity in the liver near the gallbladder fossa without worrisome characteristics statistically favored relate to a cyst  5. Increased density within the gallbladder that appears layering possibly relating to sludge or stones.  Confirmation of stones or sludge with ultrasound could be performed if desired.  Yellow Pulmonary Nodule 2017 Alert:      Labs reviewed       Results for JULISSA CHENG (MRN [id number]) as of 6/2/2021 09:22   Ref. Range 1/26/2016 09:45   Rheumatoid Factor Latest Ref Range: 0.0 - 15.0 IU/mL <10.0   Results for JULISSA CHENG (MRN [id number]) as of 6/2/2021 09:22   Ref. Range 1/26/2016 09:45   JUAN Screen Latest Ref Range: Negative <1:160  Negative <1:160     Lab Results   Component Value Date    WBC 7.51 06/14/2021    RBC 4.67 06/14/2021    HGB 13.6 06/14/2021    HCT 42.8 06/14/2021    MCV 92 06/14/2021    MCH 29.1 06/14/2021    MCHC 31.8 (L) 06/14/2021    RDW 13.2 06/14/2021     06/14/2021    MPV 9.6 06/14/2021    GRAN 5.9 06/14/2021    GRAN 79.0 (H) 06/14/2021    LYMPH 1.0 06/14/2021    LYMPH 13.0 (L) 06/14/2021    MONO 0.5 06/14/2021    MONO 6.3 06/14/2021    EOS 0.1 06/14/2021    BASO 0.02 06/14/2021    EOSINOPHIL 0.9 06/14/2021    BASOPHIL 0.3 06/14/2021       PFT results reviewed  Pulmonary Functions Testing Results:  DATE OF STUDY:  01/06/2020     REFERRING PHYSICIAN:  Otto Anton M.D.     FINDINGS:  Spirometry shows an FVC of 3.25 or 93% of predicted and FEV1 of 2.51   or 87% of predicted for an FEV1/FVC ratio of 77.  Postbronchodilator maneuver   does not show significant change in these values.  Flow volume loop is provided.    There is noted impairment or early cessation of the inspiratory limb of the   flow volume loop.  This may suggest the presence of a variable extrathoracic   obstruction, but is a very effort dependent maneuver and as such should be   interpreted with caution.   Clinical correlation would be recommended.  Lung   volume showed total lung capacity of 5.36 or 96% of predicted.  Residual volume   of 2.11 or 106% of predicted.  Diffusion capacity is measured at 17.9 or 69% of   predicted.     CONCLUSION:  Spirometry does not show definitive evidence of obstructive lung   disease, although there is some abnormality of the inspiratory limb of the flow   volume loop as discussed above.  Lung volumes are within normal limits and there   is a mild decrease in the diffusion capacity.  Given the patient's history of   tobacco use, this may be seen with some early changes of emphysema.        Plan:  Clinical impression is resonably certain and repeated evaluation prn +/- follow up will be needed as below. Continued subjective complaints CHURCH (multifactorial etiology w/ deconditioning, obesity, recent bronchitis responsible) and chronic cough (suspect reflux related). Last yr with micronodular infiltrates bilat lungs, resolved- consistent with recurrent microaspiration events.      Maggie was seen today for follow-up.    Diagnoses and all orders for this visit:    Environmental allergies    Gastroesophageal reflux disease, unspecified whether esophagitis present    Chronic cough    CHURCH (dyspnea on exertion)  -     Complete PFT with bronchodilator; Future    Physical deconditioning    Aspiration into lower respiratory tract, sequela         Follow up in about 3 months (around 4/26/2022).    Discussed with patient above for education the following:      Patient Instructions     Suspect reflux contributes to your cough, even could cause recurrent lung spots from aspiration  Consider going back to GI for further evaluation of reflux- will get recommendation for [facility]  Anti reflux diet-   Continue protonix every am, pepcid in pm  Start exercise 4 times weekly cardio to get heart rate up, start 5 min daily then work up from there.    Shortness of breath should improve with  "exercise and maintaining healthy weight.Patient Education       Acid Reflux and Gastroesophageal Reflux Disease in Adults   The Basics   Written by the doctors and editors at [facility]   What is acid reflux? -- Acid reflux is when the acid that is normally in your stomach backs up into the esophagus. The esophagus is the tube that carries food from your mouth to your stomach (figure 1).  When acid reflux causes bothersome symptoms or damage, doctors call it "gastroesophageal reflux disease" or "GERD."  What are the symptoms of acid reflux? -- The most common symptoms are:  · Heartburn, which is a burning feeling in the chest  · Regurgitation, which is when acid and undigested food flow back into your throat or mouth   Other symptoms might include:  · Stomach or chest pain  · Trouble swallowing  · Having a raspy voice or a sore throat  · Unexplained cough  · Nausea or vomiting  Is there anything I can do on my own to feel better? -- Yes. You might feel better if you:  · Lose weight (if you are overweight)  · Raise the head of your bed by 6 to 8 inches - You can do this by putting blocks of wood or rubber under 2 legs of the bed or a foam wedge under the mattress.  · Avoid foods that make your symptoms worse - For some people these include coffee, chocolate, alcohol, peppermint, and fatty foods.  · Stop smoking, if you smoke  · Avoid late meals - Lying down with a full stomach can make reflux worse. Try to plan meals for at least 2 to 3 hours before bedtime.  · Avoid tight clothing - Some people feel better if they wear comfortable clothing that does not squeeze the stomach area.   How is acid reflux treated? -- There are a few main types of medicines that can help with the symptoms of acid reflux. The most common are antacids, histamine blockers, and proton pump inhibitors (table 1). All of these medicines work by reducing or blocking stomach acid. But they each do that in a different way.  · For mild symptoms, " "antacids can help, but they work only for a short time. Histamine blockers are stronger and last longer than antacids. You can buy antacids and most histamine blockers without a prescription.  · For frequent and more severe symptoms, proton pump inhibitors are the most effective medicines. Some of these medicines are sold without a prescription. But there are other versions that your doctor can prescribe.  Sometimes, medicines cost less if you get them with a doctor's prescription. Other times, non-prescription medicines cost less. If you are worried about cost, ask your pharmacist about ways to pay less for your medicines.  Should I see a doctor or nurse about my acid reflux? -- Some people can manage their acid reflux on their own by changing their habits or taking non-prescription medicines. But you should see a doctor or nurse if:  · Your symptoms are severe or last a long time  · You cannot seem to control your symptoms  · You have had symptoms for many years  You should also see a doctor or nurse right away if you:  · Have trouble swallowing, or feel as though food gets "stuck" on the way down  · Lose weight when you are not trying to  · Have chest pain  · Choke when you eat  · Vomit blood or have bowel movements that are red, black, or look like tar  What if my child or teenager has acid reflux? -- If your child or teenager has acid reflux, take him or her to see a doctor or nurse. Do not give your child medicines to treat acid reflux without talking to a doctor or nurse.  In children, acid reflux can be caused by a number of problems. It's important to have a doctor or nurse check for these problems before trying any treatments.  All topics are updated as new evidence becomes available and our peer review process is complete.  This topic retrieved from [employer] on: Sep 21, 2021.  Topic 78867 Version 11.0  Release: 29.4.2 - C29.263  © 2021 UpToDate, Inc. and/or its affiliates. All rights reserved.  figure 1: " Upper digestive tract     The upper digestive tract includes the esophagus (the tube that connects the mouth to the stomach), the stomach, and the duodenum (the first part of the small intestine).  Graphic 66042 Version 6.0    table 1: Medicines used to reduce stomach acid  Medicine type  Medicine name examples    Antacids* Calcium carbonate (sample brand names: Maalox, Tums)    Aluminum hydroxide, magnesium hydroxide, and simethicone (sample brand name: Mylanta)   Surface agents Sucralfate (brand name: Carafate)   Histamine blockers¶  Famotidine (brand name: Pepcid)    Cimetidine (brand name: Tagamet)   Proton pump inhibitors Omeprazole (brand name: Prilosec)    Esomeprazole (brand name: Nexium)    Pantoprazole (brand name: Protonix)    Lansoprazole (brand name: Prevacid)    Dexlansoprazole (brand name: Dexilant)    Rabeprazole (brand name: AcipHex)   Graphic 68326 Version 14.0  Consumer Information Use and Disclaimer   This information is not specific medical advice and does not replace information you receive from your health care provider. This is only a brief summary of general information. It does NOT include all information about conditions, illnesses, injuries, tests, procedures, treatments, therapies, discharge instructions or life-style choices that may apply to you. You must talk with your health care provider for complete information about your health and treatment options. This information should not be used to decide whether or not to accept your health care provider's advice, instructions or recommendations. Only your health care provider has the knowledge and training to provide advice that is right for you. The use of this information is governed by the [employer] End User License Agreement, available at https://www.[employer].Musations/en/solutions/[employer]/about/jazmín.The use of [employer] content is governed by the [employer] Terms of Use. ©2021 [employer] All rights reserved.  Copyright    © 2021 [employer] and/or its affiliates. All rights reserved.

## 2022-01-26 NOTE — PATIENT INSTRUCTIONS
"Suspect reflux contributes to your cough, even could cause recurrent lung spots from aspiration  Consider going back to GI for further evaluation of reflux- will get recommendation for [facility]  Anti reflux diet-   Continue protonix every am, pepcid in pm  Start exercise 4 times weekly cardio to get heart rate up, start 5 min daily then work up from there.    Shortness of breath should improve with exercise and maintaining healthy weight.Patient Education       Acid Reflux and Gastroesophageal Reflux Disease in Adults   The Basics   Written by the doctors and editors at [facility]   What is acid reflux? -- Acid reflux is when the acid that is normally in your stomach backs up into the esophagus. The esophagus is the tube that carries food from your mouth to your stomach (figure 1).  When acid reflux causes bothersome symptoms or damage, doctors call it "gastroesophageal reflux disease" or "GERD."  What are the symptoms of acid reflux? -- The most common symptoms are:  · Heartburn, which is a burning feeling in the chest  · Regurgitation, which is when acid and undigested food flow back into your throat or mouth   Other symptoms might include:  · Stomach or chest pain  · Trouble swallowing  · Having a raspy voice or a sore throat  · Unexplained cough  · Nausea or vomiting  Is there anything I can do on my own to feel better? -- Yes. You might feel better if you:  · Lose weight (if you are overweight)  · Raise the head of your bed by 6 to 8 inches - You can do this by putting blocks of wood or rubber under 2 legs of the bed or a foam wedge under the mattress.  · Avoid foods that make your symptoms worse - For some people these include coffee, chocolate, alcohol, peppermint, and fatty foods.  · Stop smoking, if you smoke  · Avoid late meals - Lying down with a full stomach can make reflux worse. Try to plan meals for at least 2 to 3 hours before bedtime.  · Avoid tight clothing - Some people feel better " "if they wear comfortable clothing that does not squeeze the stomach area.   How is acid reflux treated? -- There are a few main types of medicines that can help with the symptoms of acid reflux. The most common are antacids, histamine blockers, and proton pump inhibitors (table 1). All of these medicines work by reducing or blocking stomach acid. But they each do that in a different way.  · For mild symptoms, antacids can help, but they work only for a short time. Histamine blockers are stronger and last longer than antacids. You can buy antacids and most histamine blockers without a prescription.  · For frequent and more severe symptoms, proton pump inhibitors are the most effective medicines. Some of these medicines are sold without a prescription. But there are other versions that your doctor can prescribe.  Sometimes, medicines cost less if you get them with a doctor's prescription. Other times, non-prescription medicines cost less. If you are worried about cost, ask your pharmacist about ways to pay less for your medicines.  Should I see a doctor or nurse about my acid reflux? -- Some people can manage their acid reflux on their own by changing their habits or taking non-prescription medicines. But you should see a doctor or nurse if:  · Your symptoms are severe or last a long time  · You cannot seem to control your symptoms  · You have had symptoms for many years  You should also see a doctor or nurse right away if you:  · Have trouble swallowing, or feel as though food gets "stuck" on the way down  · Lose weight when you are not trying to  · Have chest pain  · Choke when you eat  · Vomit blood or have bowel movements that are red, black, or look like tar  What if my child or teenager has acid reflux? -- If your child or teenager has acid reflux, take him or her to see a doctor or nurse. Do not give your child medicines to treat acid reflux without talking to a doctor or nurse.  In children, acid reflux can be " caused by a number of problems. It's important to have a doctor or nurse check for these problems before trying any treatments.  All topics are updated as new evidence becomes available and our peer review process is complete.  This topic retrieved from [employer] on: Sep 21, 2021.  Topic 34566 Version 11.0  Release: 29.4.2 - C29.263  © 2021 UpToDate, Inc. and/or its affiliates. All rights reserved.  figure 1: Upper digestive tract     The upper digestive tract includes the esophagus (the tube that connects the mouth to the stomach), the stomach, and the duodenum (the first part of the small intestine).  Graphic 39702 Version 6.0    table 1: Medicines used to reduce stomach acid  Medicine type  Medicine name examples    Antacids* Calcium carbonate (sample brand names: Maalox, Tums)    Aluminum hydroxide, magnesium hydroxide, and simethicone (sample brand name: Mylanta)   Surface agents Sucralfate (brand name: Carafate)   Histamine blockers¶  Famotidine (brand name: Pepcid)    Cimetidine (brand name: Tagamet)   Proton pump inhibitors Omeprazole (brand name: Prilosec)    Esomeprazole (brand name: Nexium)    Pantoprazole (brand name: Protonix)    Lansoprazole (brand name: Prevacid)    Dexlansoprazole (brand name: Dexilant)    Rabeprazole (brand name: AcipHex)   Graphic 03349 Version 14.0  Consumer Information Use and Disclaimer   This information is not specific medical advice and does not replace information you receive from your health care provider. This is only a brief summary of general information. It does NOT include all information about conditions, illnesses, injuries, tests, procedures, treatments, therapies, discharge instructions or life-style choices that may apply to you. You must talk with your health care provider for complete information about your health and treatment options. This information should not be used to decide whether or not to accept your health care provider's advice, instructions or  recommendations. Only your health care provider has the knowledge and training to provide advice that is right for you. The use of this information is governed by the [employer] [facility] [employer] End User License Agreement, available at https://www.[employer]/en/solutions/[employer]/about/jazmín.The use of [employer] content is governed by the [employer] Terms of Use. ©2021 UpToDate, Inc. All rights reserved.  Copyright   © 2021 UpToDate, Inc. and/or its affiliates. All rights reserved.

## 2022-01-27 LAB
BRPFT: NORMAL
DLCO ADJ PRE: 20.38 ML/(MIN*MMHG)
DLCO SINGLE BREATH LLN: 20
DLCO SINGLE BREATH PRE REF: 79.2 %
DLCO SINGLE BREATH REF: 25.73
DLCOC SBVA LLN: 3.37
DLCOC SBVA PRE REF: 103.5 %
DLCOC SBVA REF: 4.74
DLCOC SINGLE BREATH LLN: 20
DLCOC SINGLE BREATH PRE REF: 79.2 %
DLCOC SINGLE BREATH REF: 25.73
DLCOVA LLN: 3.37
DLCOVA PRE REF: 103.5 %
DLCOVA PRE: 4.9 ML/(MIN*MMHG*L)
DLCOVA REF: 4.74
DLVAADJ PRE: 4.9 ML/(MIN*MMHG*L)
ERVN2 LLN: -16449.05
ERVN2 PRE REF: 60 %
ERVN2 PRE: 0.57 L
ERVN2 REF: 0.95
FEF 25 75 CHG: 30.5 %
FEF 25 75 LLN: 1.54
FEF 25 75 POST REF: 91.4 %
FEF 25 75 PRE REF: 70 %
FEF 25 75 REF: 2.8
FET100 CHG: -27.5 %
FEV1 CHG: 3.2 %
FEV1 FVC CHG: 6.3 %
FEV1 FVC LLN: 69
FEV1 FVC POST REF: 100.9 %
FEV1 FVC PRE REF: 94.9 %
FEV1 FVC REF: 80
FEV1 LLN: 2.31
FEV1 POST REF: 82.1 %
FEV1 PRE REF: 79.6 %
FEV1 REF: 2.98
FRCN2 LLN: 2.04
FRCN2 PRE REF: 70 %
FRCN2 REF: 2.86
FVC CHG: -2.9 %
FVC LLN: 2.92
FVC POST REF: 80.8 %
FVC PRE REF: 83.3 %
FVC REF: 3.76
IVC PRE: 2.81 L
IVC SINGLE BREATH LLN: 2.92
IVC SINGLE BREATH PRE REF: 74.9 %
IVC SINGLE BREATH REF: 3.76
MVV LLN: 96
MVV PRE REF: 54.2 %
MVV REF: 113
PEF CHG: -9.3 %
PEF LLN: 5.25
PEF POST REF: 67.3 %
PEF PRE REF: 74.2 %
PEF REF: 7.12
POST FEF 25 75: 2.55 L/S
POST FET 100: 6.07 SEC
POST FEV1 FVC: 80.56 %
POST FEV1: 2.44 L
POST FVC: 3.04 L
POST PEF: 4.79 L/S
PRE DLCO: 20.38 ML/(MIN*MMHG)
PRE FEF 25 75: 1.96 L/S
PRE FET 100: 8.38 SEC
PRE FEV1 FVC: 75.79 %
PRE FEV1: 2.37 L
PRE FRC N2: 2 L
PRE FVC: 3.13 L
PRE MVV: 61 L/MIN
PRE PEF: 5.28 L/S
RVN2 LLN: 1.33
RVN2 PRE REF: 26.5 %
RVN2 PRE: 0.51 L
RVN2 REF: 1.91
RVN2TLCN2 LLN: 27.05
RVN2TLCN2 PRE REF: 37.9 %
RVN2TLCN2 PRE: 13.9 %
RVN2TLCN2 REF: 36.64
TLCN2 LLN: 4.44
TLCN2 PRE REF: 66.9 %
TLCN2 PRE: 3.63 L
TLCN2 REF: 5.43
VA PRE: 4.16 L
VA SINGLE BREATH LLN: 5.28
VA SINGLE BREATH PRE REF: 78.8 %
VA SINGLE BREATH REF: 5.28
VCMAXN2 LLN: 2.92
VCMAXN2 PRE REF: 83.3 %
VCMAXN2 PRE: 3.13 L
VCMAXN2 REF: 3.76

## 2022-01-28 ENCOUNTER — PATIENT MESSAGE (OUTPATIENT)
Dept: PULMONOLOGY | Facility: CLINIC | Age: 52
End: 2022-01-28
Payer: COMMERCIAL

## 2022-01-29 ENCOUNTER — PATIENT MESSAGE (OUTPATIENT)
Dept: PULMONOLOGY | Facility: CLINIC | Age: 52
End: 2022-01-29
Payer: COMMERCIAL

## 2022-06-03 ENCOUNTER — PATIENT MESSAGE (OUTPATIENT)
Dept: PULMONOLOGY | Facility: CLINIC | Age: 52
End: 2022-06-03
Payer: COMMERCIAL

## 2022-06-06 ENCOUNTER — PATIENT MESSAGE (OUTPATIENT)
Dept: PULMONOLOGY | Facility: CLINIC | Age: 52
End: 2022-06-06
Payer: COMMERCIAL

## 2022-10-26 ENCOUNTER — TELEPHONE (OUTPATIENT)
Dept: PULMONOLOGY | Facility: CLINIC | Age: 52
End: 2022-10-26
Payer: COMMERCIAL

## 2022-10-27 ENCOUNTER — TELEPHONE (OUTPATIENT)
Dept: PULMONOLOGY | Facility: CLINIC | Age: 52
End: 2022-10-27
Payer: COMMERCIAL

## 2022-10-27 NOTE — TELEPHONE ENCOUNTER
Left message for pt to call back to schedule an appt    Recvd referral from Dr Anton   chronic cough/SOB  recommends a bronchoscopy for this pt

## 2022-11-08 ENCOUNTER — OFFICE VISIT (OUTPATIENT)
Dept: PULMONOLOGY | Facility: CLINIC | Age: 52
End: 2022-11-08
Payer: COMMERCIAL

## 2022-11-08 VITALS
HEART RATE: 110 BPM | HEIGHT: 67 IN | SYSTOLIC BLOOD PRESSURE: 131 MMHG | OXYGEN SATURATION: 97 % | BODY MASS INDEX: 35.64 KG/M2 | WEIGHT: 227.06 LBS | DIASTOLIC BLOOD PRESSURE: 70 MMHG

## 2022-11-08 DIAGNOSIS — K21.9 GASTROESOPHAGEAL REFLUX DISEASE, UNSPECIFIED WHETHER ESOPHAGITIS PRESENT: ICD-10-CM

## 2022-11-08 DIAGNOSIS — R05.3 CHRONIC COUGH: Primary | ICD-10-CM

## 2022-11-08 PROBLEM — R91.1 LUNG NODULE: Status: RESOLVED | Noted: 2021-06-14 | Resolved: 2022-11-08

## 2022-11-08 PROCEDURE — 3075F PR MOST RECENT SYSTOLIC BLOOD PRESS GE 130-139MM HG: ICD-10-PCS | Mod: CPTII,S$GLB,, | Performed by: INTERNAL MEDICINE

## 2022-11-08 PROCEDURE — 4010F ACE/ARB THERAPY RXD/TAKEN: CPT | Mod: CPTII,S$GLB,, | Performed by: INTERNAL MEDICINE

## 2022-11-08 PROCEDURE — 99999 PR PBB SHADOW E&M-EST. PATIENT-LVL IV: CPT | Mod: PBBFAC,,, | Performed by: INTERNAL MEDICINE

## 2022-11-08 PROCEDURE — 3078F PR MOST RECENT DIASTOLIC BLOOD PRESSURE < 80 MM HG: ICD-10-PCS | Mod: CPTII,S$GLB,, | Performed by: INTERNAL MEDICINE

## 2022-11-08 PROCEDURE — 3008F BODY MASS INDEX DOCD: CPT | Mod: CPTII,S$GLB,, | Performed by: INTERNAL MEDICINE

## 2022-11-08 PROCEDURE — 4010F PR ACE/ARB THEARPY RXD/TAKEN: ICD-10-PCS | Mod: CPTII,S$GLB,, | Performed by: INTERNAL MEDICINE

## 2022-11-08 PROCEDURE — 99999 PR PBB SHADOW E&M-EST. PATIENT-LVL IV: ICD-10-PCS | Mod: PBBFAC,,, | Performed by: INTERNAL MEDICINE

## 2022-11-08 PROCEDURE — 1159F PR MEDICATION LIST DOCUMENTED IN MEDICAL RECORD: ICD-10-PCS | Mod: CPTII,S$GLB,, | Performed by: INTERNAL MEDICINE

## 2022-11-08 PROCEDURE — 1159F MED LIST DOCD IN RCRD: CPT | Mod: CPTII,S$GLB,, | Performed by: INTERNAL MEDICINE

## 2022-11-08 PROCEDURE — 99214 OFFICE O/P EST MOD 30 MIN: CPT | Mod: S$GLB,,, | Performed by: INTERNAL MEDICINE

## 2022-11-08 PROCEDURE — 3078F DIAST BP <80 MM HG: CPT | Mod: CPTII,S$GLB,, | Performed by: INTERNAL MEDICINE

## 2022-11-08 PROCEDURE — 3008F PR BODY MASS INDEX (BMI) DOCUMENTED: ICD-10-PCS | Mod: CPTII,S$GLB,, | Performed by: INTERNAL MEDICINE

## 2022-11-08 PROCEDURE — 99214 PR OFFICE/OUTPT VISIT, EST, LEVL IV, 30-39 MIN: ICD-10-PCS | Mod: S$GLB,,, | Performed by: INTERNAL MEDICINE

## 2022-11-08 PROCEDURE — 3075F SYST BP GE 130 - 139MM HG: CPT | Mod: CPTII,S$GLB,, | Performed by: INTERNAL MEDICINE

## 2022-11-08 NOTE — PROGRESS NOTES
"11/8/2022    Maggie Caroline  Office Note    Chief Complaint   Patient presents with    Cough       HPI:  11/08/2022- pt continues to have persistent cough, seen by PCP. She states that he sent her back for possible bronchoscopy.   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 She has new mini donkey Gonzo, chickens. She does not notice any particular triggers which will cause her to cough.   Notes cough worse after she eats and after she lies down. She has very little phlegm but mostly dry cough. She gets SOB easily with exertion.       01/26/2022-   Suspect reflux contributes to your cough, even could cause recurrent lung spots from aspiration  Consider going back to GI for further evaluation of reflux- will get recommendation for [facility]  Anti reflux diet-   Continue protonix every am, pepcid in pm  Start exercise 4 times weekly cardio to get heart rate up, start 5 min daily then work up from there.  Shortness of breath should improve with exercise and maintaining healthy weight.    feels winded trying to manage farm. Sick after ambrosio, took 4 covid tests but neg. Had sore throat, body aches, coughing with congestion. Took steroid and abx. Ended up with sinus infection since then. Still lingering cough now. Tries not to overexert herself, feels scared when she gets winded. Feels lungs are "not where they should be." for example picking up headboard and footboard loading into truck felt very out of breath. Used to do a lot of cardio exercise until 2 yrs ago got out of routine, started gaining wt. Few mos 2.5 yr ago vaped for a little while. Reflux is bad, takes protonix every am and sometimes chewable pepcid at night. Eats late at night. Few times in past remembers waking up choking like reflux came up into throat.  Takes advil 3x/week with MS shots  Coughs when laughing or speaking too much, sensitive to aerosols and perfumes. Did not feel like inhalers helped, not using currently.  Continues " zyrtec, nasacort for nasal allergies.    06/30/2021-   Can stop nebulizer and trelegy inhaler  Spots in the lung are gone- lungs clear now  May have been allergy to the birds or an unusual infection but it's gone now  Try astelin nasal spray for allergies/sinus problems  Start exercise program and build up tolerance  on 6/14 pt presented for lung biopsy but CT findings had resolved at that time so no biopsy done. Feels like getting a cold past 2d with nasal congestion and throat irritation, cough. Denies fever, chills, phlegm. Taking zyrtec and OTC cold medicine. Uses flonase nasal spray.  similar symptoms. Feels like she has allergies off and on. Has multiple pets but no birds any more.  SOB with exertion first noticed about 6 mos ago on a trip in mountains with friends- BRAD Shaikh in 10/2020. Still doesn't feel back to normal. Was treated with steroids, nebulizer, singulair, trelegy inhaler but didn't notice benefit from anything but the steroids.  Has gained wt almost 40# and does not feel like herself. Used to go to gym, hike and exercise more.  Got first covid shot but didn't get second one because she was afraid it might make her feel worse.    06/02/2021-   Lung biopsy recommended- procedure is done by radiologist. Small risk of lung collapse, bleeding, worsening infection/inflammation or requiring chest tube/surgery but benefits (getting a diagnosis) are felt to outweigh risks.  Instructions for procedure:    no blood thinners or NSAIDs 1 week prior to procedure   Fast after midnight day of procedure   Have a  for procedure   Get covid test 2-3 days prior to procedure  pt endorses cough and dyspnea progressing over 1 year. Taking 20mg prednisone and failed taper in past with recurrent worsening symptoms. Denies phlegm. She got rid of her bird 2/2021. Pt is a hairdresser and . Endorses broken wisdom tooth. Choked on a pill in 2019- couldn't breathe for a bit then never able to  expectorate pill. Sometimes coughs when she swallows and feels like things go down the wrong way.    4/8/2021-   Continue Trelegy 1 puff daily  Use Budesonide nebulizer 1-2 times daily until breathing improves to pre ILD levels.  Will schedule CT of chest in May which is three months from your last CT of chest to evaluate for any changes.  Expect some residual swelling to return now that prednisone taper is over, but this will improve with time.  finished prednisone taper 7 days prior, does feel she gets winded easier than before but can continue her activity.   Cough- lessened in severity, less frequent, cough daily, orse in early morning. No nocturnal arousals, chest tightness, or wheeze.  Non productive cough        2/25/21- Re-homed pet bird, notice improvement in SOB and cough after starting Trelegy and prednisone taper.    Cough- decreased, worse when eating, no chest tightness or wheeze. Using nebulizer 1x daily at first now only as needed.   Decided to hold off on Fasenra due to dramatic improvement in breathing on prednisone therapy.      11/25/2020- States breathing is better when on all therapy of daily inhalers, prednisone, and nebulizer treatments. But when she stops prednisone daily has cough worsen with in one week. No nocturnal arousals, states cough is worse after eating and late at night.   States pet Bird Parrot for over three years, states symptoms started in last year.     10/19/20- Cough- worsening, improved while on prednisone 20 mg daily for 3 days but returned shortly after finishing prescription, non productive but has small amount of clear mucous with coughing fits. constant tickle in back of throat.    Complaint of sore throat, no fever  Complaint of fatigue and SOB 5 days prior while walking outside for hours. Worse with exertion, Did notice improvement with albuterol rescue inhaler.      10/8/20- referred by PCP for dry cough, onset 10 months, daily, did not improve with reflux  medication, states recurrent problem that occurs for few weeks yearly states seasonal. Worse after eating and when laying down at night. No nocturnal arousals. States she can not laugh without coughing. Worse when around strong smells such as hairspray. PCP started on Spiriva and albuterol with no benefit. States humidifier improved.   SOB- onset 10 months, with exertion, improved when she visited the mountains, associated with chest tightness. Has occasional wheeze only at night.     Social Hx: Lives with . 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  no known Asbestosis exposure, Smoking Hx: quit , 19 pack years, exposed to second hand smoke as child  Family Hx: no Lung Cancer, Aunt COPD, no Asthma  Medical Hx: no previous pneumonia ; no previous shoulder/chest surgery; Dx MS 2016 followed by Neurologist at [facility]; dx IBS     The chief compliant  problem is varies with instability at times      PFSH:  Past Medical History:   Diagnosis Date    Abnormal Pap smear of cervix     dysplasia/ cryo/ laser    Allergy     Anxiety     Endometriosis     Hypertension     Multiple sclerosis     RLS (restless legs syndrome)          Past Surgical History:   Procedure Laterality Date    had a laser surgery on the cervix      HYSTERECTOMY       Social History     Tobacco Use    Smoking status: Former     Types: Cigarettes     Quit date:      Years since quittin.8    Smokeless tobacco: Never   Substance Use Topics    Alcohol use: No    Drug use: No     Family History   Problem Relation Age of Onset    Heart disease Mother     Heart attack Mother     Heart attack Father     Hypertension Father     Diabetes Sister     Heart disease Maternal Grandmother     Heart attack Maternal Grandmother     Aneurysm Maternal Grandfather     Heart disease Paternal Grandmother     Heart attack Paternal Grandfather   "   Breast cancer Paternal Aunt 65    Ovarian cancer Neg Hx      Review of patient's allergies indicates:   Allergen Reactions    Iodine and iodide containing products Nausea And Vomiting     I have reviewed past medical, family, and social history. I have reviewed previous nurse notes.    Performance Status:The patient's activity level is no limits with regular activity.      Review of Systems:  a review of eleven systems covering constitutional, Eye, HEENT, Psych, Respiratory, Cardiac, GI, , Musculoskeletal, Endocrine, Dermatologic was negative except for pertinent findings as listed ABOVE and below:  Occasional palpitations   Dysphagia  Chokes/coughs on saliva  Throat tickle    Exam:Comprehensive exam done. /70 (BP Location: Left arm, Patient Position: Sitting, BP Method: Medium (Automatic))   Pulse 110   Ht 5' 7" (1.702 m)   Wt 103 kg (227 lb 1.2 oz)   LMP 03/10/2016   SpO2 97%   BMI 35.56 kg/m²   Exam included Vitals as listed, and patient's appearance and affect and alertness and mood, oral exam for yeast and hygiene and pharynx lesions and Mallapatti (M) score, neck with inspection for jvd and masses and thyroid abnormalities and lymph nodes (supraclavicular and infraclavicular nodes and axillary also examined and noted if abn), chest exam included symmetry and effort and fremitus and percussion and auscultation, cardiac exam included rhythm and gallops and murmur and rubs and jvd and edema, abdominal exam for mass and hepatosplenomegaly and tenderness and hernias and bowel sounds, Musculoskeletal exam with muscle tone and posture and mobility/gait and  strength, and skin for rashes and cyanosis and pallor and turgor, extremity for clubbing.  Findings were normal except for pertinent findings listed below:  Oropharynx clear, M2  Lungs clear  No edema    Radiographs (ct chest and cxr) reviewed: reviewed by direct vision  CT chest 7/2022- very scant RML tree in bud ground glass nodules " (improved appearance since 2/2021), otherwise completely clear lungs    CT chest 6/14/21- resolution of previously seen opacities  CT chest 5/17/21- RUL nodular consolidation. Scattered ground glass and micronodular abnormalities bilaterally in a peripheral predominant pattern    CT Chest Without Contrast 02/03/2021   1. Multifocal diffuse tree-in-bud type changes suggestive a resolving or developing pneumonitis or pneumonia.  This could relate to a viral pneumonitis.  Clinical correlation and follow-up for resolution is suggested.  2. An 8 mm solid consolidative nodule is noted along with a 7 mm region of ground-glass consolidation.  Follow-up in 3-6 months with CT for resolution would be suggested.  3. Mediastinal nodes more numerous than expected several at the upper limits of normal in size.  These could be followed for resolution or stability along with the pulmonary nodules with CT the imaging in 3-6 months.  Clinical correlation is suggested.  These are likely reactive but neoplastic or inflammatory sources are on the differential  4. Hypodensity in the liver near the gallbladder fossa without worrisome characteristics statistically favored relate to a cyst  5. Increased density within the gallbladder that appears layering possibly relating to sludge or stones.  Confirmation of stones or sludge with ultrasound could be performed if desired.  Yellow Pulmonary Nodule 2017 Alert:      Labs reviewed       Results for JULISSA CHENG (MRN [id number]) as of 6/2/2021 09:22   Ref. Range 1/26/2016 09:45   Rheumatoid Factor Latest Ref Range: 0.0 - 15.0 IU/mL <10.0   Results for JULISSA CHENG (MRN [id number]) as of 6/2/2021 09:22   Ref. Range 1/26/2016 09:45   JUAN Screen Latest Ref Range: Negative <1:160  Negative <1:160     PFT results reviewed  Pulmonary Functions Testing Results:  DATE OF STUDY:  01/06/2020     REFERRING PHYSICIAN:  Otto Anton M.D.     FINDINGS:  Spirometry shows an FVC of 3.25 or 93% of predicted  and FEV1 of 2.51   or 87% of predicted for an FEV1/FVC ratio of 77.  Postbronchodilator maneuver   does not show significant change in these values.  Flow volume loop is provided.    There is noted impairment or early cessation of the inspiratory limb of the   flow volume loop.  This may suggest the presence of a variable extrathoracic   obstruction, but is a very effort dependent maneuver and as such should be   interpreted with caution.  Clinical correlation would be recommended.  Lung   volume showed total lung capacity of 5.36 or 96% of predicted.  Residual volume   of 2.11 or 106% of predicted.  Diffusion capacity is measured at 17.9 or 69% of   predicted.     CONCLUSION:  Spirometry does not show definitive evidence of obstructive lung   disease, although there is some abnormality of the inspiratory limb of the flow   volume loop as discussed above.  Lung volumes are within normal limits and there   is a mild decrease in the diffusion capacity.  Given the patient's history of   tobacco use, this may be seen with some early changes of emphysema.        Plan:  Clinical impression is resonably certain and repeated evaluation prn +/- follow up will be needed as below. Chronic cough, suspect upper airway cough syndrome/reflux. Doubt active pulmonary process- RML ground glass nodules related to previous infection and appear improved. May be neuropathic cough and respond to gabapentin in future? ENT eval warranted first      Maggie was seen today for cough.    Diagnoses and all orders for this visit:    Chronic cough  -     Ambulatory referral/consult to ENT; Future  -     Ambulatory referral/consult to Allergy; Future    Gastroesophageal reflux disease, unspecified whether esophagitis present         Follow up in about 2 months (around 1/8/2023).    Discussed with patient above for education the following:      Patient Instructions   Suspect throat irritation or even neurogenic cough - may consider meds to suppress  cough in the future  Continue reflux medications  See ENT for further evaluation  Allergy testing recommended- allergist referral

## 2022-11-08 NOTE — PATIENT INSTRUCTIONS
Suspect throat irritation or even neurogenic cough - may consider meds to suppress cough in the future  Continue reflux medications  See ENT for further evaluation  Allergy testing recommended- allergist referral

## 2022-11-18 ENCOUNTER — OFFICE VISIT (OUTPATIENT)
Dept: OTOLARYNGOLOGY | Facility: CLINIC | Age: 52
End: 2022-11-18
Payer: COMMERCIAL

## 2022-11-18 VITALS — HEIGHT: 67 IN | BODY MASS INDEX: 35.64 KG/M2 | WEIGHT: 227.06 LBS | TEMPERATURE: 98 F

## 2022-11-18 DIAGNOSIS — R09.82 POST-NASAL DRIP: Primary | ICD-10-CM

## 2022-11-18 DIAGNOSIS — R05.3 CHRONIC COUGH: ICD-10-CM

## 2022-11-18 DIAGNOSIS — R09.81 NASAL CONGESTION: ICD-10-CM

## 2022-11-18 PROCEDURE — 99999 PR PBB SHADOW E&M-EST. PATIENT-LVL IV: ICD-10-PCS | Mod: PBBFAC,,, | Performed by: PHYSICIAN ASSISTANT

## 2022-11-18 PROCEDURE — 3008F BODY MASS INDEX DOCD: CPT | Mod: CPTII,S$GLB,, | Performed by: PHYSICIAN ASSISTANT

## 2022-11-18 PROCEDURE — 31575 DIAGNOSTIC LARYNGOSCOPY: CPT | Mod: S$GLB,,, | Performed by: PHYSICIAN ASSISTANT

## 2022-11-18 PROCEDURE — 1160F RVW MEDS BY RX/DR IN RCRD: CPT | Mod: CPTII,S$GLB,, | Performed by: PHYSICIAN ASSISTANT

## 2022-11-18 PROCEDURE — 1159F MED LIST DOCD IN RCRD: CPT | Mod: CPTII,S$GLB,, | Performed by: PHYSICIAN ASSISTANT

## 2022-11-18 PROCEDURE — 31575 LARYNGOSCOPY: ICD-10-PCS | Mod: S$GLB,,, | Performed by: PHYSICIAN ASSISTANT

## 2022-11-18 PROCEDURE — 99999 PR PBB SHADOW E&M-EST. PATIENT-LVL IV: CPT | Mod: PBBFAC,,, | Performed by: PHYSICIAN ASSISTANT

## 2022-11-18 PROCEDURE — 99204 PR OFFICE/OUTPT VISIT, NEW, LEVL IV, 45-59 MIN: ICD-10-PCS | Mod: 25,S$GLB,, | Performed by: PHYSICIAN ASSISTANT

## 2022-11-18 PROCEDURE — 4010F PR ACE/ARB THEARPY RXD/TAKEN: ICD-10-PCS | Mod: CPTII,S$GLB,, | Performed by: PHYSICIAN ASSISTANT

## 2022-11-18 PROCEDURE — 1160F PR REVIEW ALL MEDS BY PRESCRIBER/CLIN PHARMACIST DOCUMENTED: ICD-10-PCS | Mod: CPTII,S$GLB,, | Performed by: PHYSICIAN ASSISTANT

## 2022-11-18 PROCEDURE — 99204 OFFICE O/P NEW MOD 45 MIN: CPT | Mod: 25,S$GLB,, | Performed by: PHYSICIAN ASSISTANT

## 2022-11-18 PROCEDURE — 3008F PR BODY MASS INDEX (BMI) DOCUMENTED: ICD-10-PCS | Mod: CPTII,S$GLB,, | Performed by: PHYSICIAN ASSISTANT

## 2022-11-18 PROCEDURE — 1159F PR MEDICATION LIST DOCUMENTED IN MEDICAL RECORD: ICD-10-PCS | Mod: CPTII,S$GLB,, | Performed by: PHYSICIAN ASSISTANT

## 2022-11-18 PROCEDURE — 4010F ACE/ARB THERAPY RXD/TAKEN: CPT | Mod: CPTII,S$GLB,, | Performed by: PHYSICIAN ASSISTANT

## 2022-11-18 RX ORDER — HYDROCHLOROTHIAZIDE 25 MG/1
TABLET ORAL
COMMUNITY

## 2022-11-18 RX ORDER — PRAMIPEXOLE DIHYDROCHLORIDE 1 MG/1
TABLET ORAL
COMMUNITY

## 2022-11-18 RX ORDER — AZELASTINE 1 MG/ML
1 SPRAY, METERED NASAL 2 TIMES DAILY
Qty: 30 ML | Refills: 3 | Status: SHIPPED | OUTPATIENT
Start: 2022-11-18 | End: 2023-06-08

## 2022-11-18 RX ORDER — LEVOCETIRIZINE DIHYDROCHLORIDE 5 MG/1
5 TABLET, FILM COATED ORAL NIGHTLY
Qty: 30 TABLET | Refills: 2 | Status: SHIPPED | OUTPATIENT
Start: 2022-11-18 | End: 2023-11-18

## 2022-11-18 RX ORDER — GABAPENTIN 100 MG/1
CAPSULE ORAL
Qty: 90 CAPSULE | Refills: 0 | Status: CANCELLED | OUTPATIENT
Start: 2022-11-18

## 2022-11-18 RX ORDER — MONTELUKAST SODIUM 10 MG/1
TABLET ORAL
COMMUNITY

## 2022-11-18 NOTE — PROGRESS NOTES
[facility]    Subjective:      Patient: Maggie Velazquez Patient PCP: Otto Anton MD         :  1970     Sex:  female      MRN:  [id number]          Date of Visit: 2022      Chief Complaint: Cough (Has had ongoing cough. Saw Pulmonology and has seen GI.  Pulmonologist recommended ENT evaluation. Pt will also see Allergist on 22.  RSI 29/45, GERD 4/5. )    Patient ID: Maggie Velazquez is a 52 y.o. female who presents to clinic for chronic cough. Pt seen by pulmonology for chronic cough and advised to follow up with ENT. Pt stats that she has increase in cough after she eats. Pt states that she has had issues with acid reflux food coming up in her mough around once every few weeks and she states that she thinks this is happening while she is sleeping. Pt states that she has occasional heartburn-every few months. Pt states she has increase in throat aggitation and has occasional hoarseness when she gets colds/URIs. Pt states that he has issues with her chest hurting as food goes down-pt states that food gets stuck and has issues with food going down. Last EGD completed around 2 years ago by Dr. LISA Hunt in [place], [place] which revealed gastritis and a small hiatal hernia. Pt is on valsartan for HTN and states she was placed on valsartan around the same time her chronic cough developed. Pt on protonix 40mg once daily for GERD. Pt had IgE, IgG subclasses 1-4, IgA and IgM labs ordered 4 weeks ago by family medicine for evaluation of recurrent URI and chronic cough that are WNL. Pt has allergy appt planned for 2022. Pt endorses seasonal allergy symptoms around fall and spring time. Pt states that she takes zyrtec daily. Pt states that she has chronic post-nasal drip. Pt states that she is using flonase 2 puffs once daily. Pt states that she has mild nasal congestion.  Pt denies fever/chill, green/yellow nasal discharge or sinus pain/pressure. Pt is former smoker who quit smoking in  . CT chest WO 2022 shows stable right middle lobe nodules.    Pt states that family medicine was concerned for possible vocal cord dysfunction. Pt denies sudden episodes of shortness of breath or choking sensation in throat.    Past Medical History  She has a past medical history of Abnormal Pap smear of cervix, Allergy, Anxiety, Endometriosis, Hypertension, Multiple sclerosis, and RLS (restless legs syndrome).    Family History  Her family history includes Aneurysm in her maternal grandfather; Breast cancer (age of onset: 65) in her paternal aunt; Diabetes in her sister; Heart attack in her father, maternal grandmother, mother, and paternal grandfather; Heart disease in her maternal grandmother, mother, and paternal grandmother; Hypertension in her father.    Past Surgical History:   Procedure Laterality Date    had a laser surgery on the cervix      HYSTERECTOMY       Social History     Tobacco Use    Smoking status: Former     Types: Cigarettes     Quit date:      Years since quittin.8    Smokeless tobacco: Never   Substance and Sexual Activity    Alcohol use: No    Drug use: No    Sexual activity: Yes     Partners: Male     Birth control/protection: None     Medications  She has a current medication list which includes the following prescription(s): alprazolam, [id number]-copper-fa, hydrochlorothiazide, ibuprofen, kesimpta pen, montelukast, pantoprazole, pramipexole, valsartan, vitamin d, azelastine, and levocetirizine.    Review of patient's allergies indicates:   Allergen Reactions    Iodine and iodide containing products Nausea And Vomiting     All medications, allergies, and past history have been reviewed.    Objective:      Vitals:  Vitals - 1 value per visit 2022   SYSTOLIC 131 - -   DIASTOLIC 70 - -   Pulse 110 - -   Temp - - 97.8   Resp - - -   SPO2 97 - -   Weight (lb) 227.07 - 227.07   Weight (kg) 103 - 103   Height 67 - 67   BMI (Calculated) 35.6  - 35.6   VISIT REPORT - - -   Pain Score  - 0 -       Body surface area is 2.21 meters squared.  Physical Exam  Constitutional:       General: She is not in acute distress.     Appearance: Normal appearance. She is not ill-appearing.   HENT:      Head: Normocephalic and atraumatic.      Right Ear: Tympanic membrane, ear canal and external ear normal.      Left Ear: Tympanic membrane, ear canal and external ear normal.      Nose: Nose normal.      Mouth/Throat:      Lips: Pink. No lesions.      Mouth: Mucous membranes are moist. No oral lesions.      Tongue: No lesions.      Palate: No lesions.      Pharynx: Oropharynx is clear. Uvula midline. No pharyngeal swelling, oropharyngeal exudate, posterior oropharyngeal erythema or uvula swelling.   Eyes:      General:         Right eye: No discharge.         Left eye: No discharge.      Extraocular Movements: Extraocular movements intact.      Conjunctiva/sclera: Conjunctivae normal.   Pulmonary:      Effort: Pulmonary effort is normal.   Neurological:      General: No focal deficit present.      Mental Status: She is alert and oriented to person, place, and time. Mental status is at baseline.   Psychiatric:         Mood and Affect: Mood normal.         Behavior: Behavior normal.         Thought Content: Thought content normal.         Judgment: Judgment normal.     Laryngoscopy     Date/Time: 11/18/2022 1:00 PM  Performed by: Colby Daniels PA-C  Authorized by: Colby Daniels PA-C      Consent Done?:  Yes (Verbal)  Anesthesia:     Local anesthetic:  Lidocaine 4% and Bong-Synephrine 1/2%    Patient tolerance:  Patient tolerated the procedure well with no immediate complications    Decongestion performed?: Yes    Laryngoscopy:     Areas examined:  Nasal cavities, nasopharynx, oropharynx, hypopharynx, larynx and vocal cords    Laryngoscope size: flexible disposable storz scope.  Nose External:      No external nasal deformity  Nose Intranasal:      Mucosa no  polyps     Mucosa ulcers not present     No mucosa lesions present     Septum gross deformity     Turbinates not enlarged  Nasopharynx:      No mucosa lesions     Posterior choanae patent     Eustachian tube patent  Larynx/hypopharynx:      No epiglottis lesions     No epiglottis edema     No AE folds lesions     No vocal cord polyps     Equal and normal bilateral     No hypopharynx lesions     No piriform sinus pooling     No piriform sinus lesions     No post cricoid edema     No post cricoid erythema    Labs:  WBC   Date Value Ref Range Status   06/14/2021 7.51 3.90 - 12.70 K/uL Final     Eosinophil %   Date Value Ref Range Status   06/14/2021 0.9 0.0 - 8.0 % Final     Eos #   Date Value Ref Range Status   06/14/2021 0.1 0.0 - 0.5 K/uL Final     Platelets   Date Value Ref Range Status   06/14/2021 219 150 - 450 K/uL Final     Glucose   Date Value Ref Range Status   06/22/2020 94 70 - 110 mg/dL Final     Comment:     The ADA recommends the following guidelines for fasting glucose:  Normal:       less than 100 mg/dL  Prediabetes:  100 mg/dL to 125 mg/dL  Diabetes:     126 mg/dL or higher     IgE  Order: [id number]   Ref Range & Units 4 wk ago   IgE Total 6 - 495 IU/mL 51    IgG, Subclasses(1-4)  Order: [id number]   Ref Range & Units 4 wk ago Resulting Agency   Immunoglobulin G Level 586 - 1602 mg/dL 772  [facility]   IgG Subclass 1 248 - 810 mg/dL 362  [facility]   IgG Subclass 2 130 - 555 mg/dL 279  [facility]   IgG Subclass 3 15 - 102 mg/dL 58  [facility]   IgG Subclass 4 2 - 96 mg/dL 35  [facility]     IgG, IgA, IgM  Order: [id number]   Ref Range & Units 4 wk ago   Immunoglobulin A Level 87 - 352 mg/dL 136    Immunoglobulin M Level 26 - 217 mg/dL 75    Resulting Agency  [facility]     All lab results, imaging results, and data have been reviewed.    Assessment:        ICD-10-CM ICD-9-CM   1. Post-nasal drip   R09.82 784.91   2. Chronic cough  R05.3 786.2   3. Nasal congestion  R09.81 478.19            Plan:      Pt's laryngoscopy suprisingly does not show signs of inflammation, but this does not fully rule out GERD/silent reflux attributing to cough. Pt advised that silent reflux can sometimes cause post-nasal drip and post-nasal drip can worsen cough. Pt is to proceed with allergy follow up planned for 11/28/2022. I doubt paradoxical vocal fold motion/vocal cord dysfunction since symptoms not congruent with PVFM. Pt does have post-nasal drip which could be secondary to acid reflux vs allergies, which can worsen cough. Pt advised to follow acid reflux handout as provided via [facility] and to continue protonix 40mg once daily for GERD. Pt does have sensation of food getting stuck in chest area and when asked to point to area, pt pointed to upper chest below throat, so I suggest that pt follow up with her GI for possible repeat EGD due to what sounds like esophageal dysphagia and due to issues with GERD. Pt is to start NeilMed sinus rinse two times daily. After doing jerod med sinus rinse, use flonase 1 puff to each nostril twice daily along with xyzal 5mg at night for post-nasal drip as post-nasal drip is likely worsening cough. Pt has trouble remembering if cough started after starting valsartan, but states that she started taking valsartan for HTN a couple of years ago and has had cough for a couple of years. Pt advised to reach out to her PCP who is prescribing her valsartan to request medication change and advised to try anti-hypertensive that is not ACEI or ARB to see if this helps with cough as this could be attributing to cough. Pt advised that if she continues to have cough after discontinuing valsartan and doing above management for GERD and for post-nasal drip with relief of GERD and post-nasal drip, then gabapentin would be reasonable option for possibility of neurogenic cough. Will have pt follow up in 2 months after  she does above recommendations for cough and can follow up sooner if cough worsens.      ambulatory

## 2022-11-18 NOTE — PATIENT INSTRUCTIONS
Discuss switching valsartan to non ACE or ARB for hypertension to see if this helps reduce cough.     ACID REFLUX   What is acid reflux?    When we eat, food passes from the throat and into the stomach through a tube called the esophagus. At the bottom of the esophagus is a ring of muscles that acts as a valve between the esophagus and stomach, called the lower esophageal sphincter. Smoking, alcohol, and certain types of food may weaken the sphincter, so it may stop closing properly. The contents in the stomach then may leak back, or reflux, into the esophagus. This problem is called gastroesophageal reflux disease (GERD). Symptoms of GERD include heartburn, belching, regurgitation of stomach contents, and swallowing difficulties.    Sometimes, the stomach acid travels up through the esophagus and spills into the larynx or pharynx (voice box). This is called laryngopharyngeal reflux (LPR) and is irritating to the vocal folds and surrounding tissues. Often, patients with LPR do not experience heartburn as a symptom. More commonly, symptoms of LPR include hoarseness, excessive mucous resulting in frequent throat clearing, post-nasal drip, coughing, throat soreness or burning, choking episodes, difficulty swallowing, and sensation of a lump in the throat.     How is acid reflux treated?   Treatment for acid reflux can involve any combination of medication, lifestyle modifications, and surgery.   Medications. Your doctor may prescribe a proton pump inhibitor (PPI) or an H2 blocker. If you are prescribed a PPI, take in on an empty stomach in the morning 30 minutes prior to eating breakfast. Keep in mind that it may take 4-6 weeks before symptoms begin to resolve, so do not stop medications without consulting your doctor.   Lifestyle and dietary modifications. Eat smaller meals at a slower pace. Avoid over-eating. If you are overweight, try to lose weight. Do not lie down or exercise directly after eating; eat your last  meal of the day at least 2-3 hours prior to going to sleep. Avoid tight-fitting clothes. If you are a smoker, reduce or quit smoking. Elevate your head of bed 4-6 inches by putting phone books under the legs at the head of your bed or buy a wedge pillow, but do not use more than two regular pillows as this causes the body to curl and compresses your stomach.     Food group Foods to avoid to reduce reflux   Beverages  Whole milk, 2% milk, chocolate milk/hot chocolate, alcohol, coffee (regular and decaf), caffeinated tea, mint tea, carbonated beverages, citrus juice    Breads/grains Commercial sweet rolls, doughnuts, croissants, and other high-fat pastries    Fruits and vegetables Fried or cream-style vegetables, tomatoes, tomato-based products, citrus fruits, hot peppers    Soups and seasonings Cream, cheese, tomato-based soups, vinegar    Meats and proteins Fatty or fried meat/fish, ervin, sausage, pepperoni, lunch meat, fried eggs    Fats and oil Lard, ervin drippings, salt pork, meat drippings, gravies, highly seasoned salad dressings, nuts    Sweets/desserts Anything made with or from chocolate, peppermint, spearmint, whole milk, or cream; high-fat pastries, gum, hard candy       Use NeilMed sinus rinse two times daily. After doing jerod med sinus rinse, use flonase 1 puff to each nostril twice daily along with xyzal 5mg at night for post-nasal drip.          DIRECTIONS FOR SINUS SALINE RINSE To see demonstration: Enter http://www.Theroste[employer].com/watch?v=[email or web address] into the browser address box, or go to You tube, and under the search box, enter sinus rinse. Click on NeilMed Sinus Rinse Video    Step 1    Step 1 Please wash your hands. Fill the clean bottle with the designated volume of warm distilled water, filtered water or previously boiled water. You may warm the water in a microwave but we recommend that you warm it in increments of five seconds. This is to avoid excessive heating of the water and damage to  the device or scalding your nasal passage.    Step 2    Step 2 Cut the SINUS RINSE mixture packet at the corner and pour its contents into the bottle. Tighten the cap & tube on the bottle securely, place one finger over the tip of the cap and shake the bottle gently to dissolve the mixture.      Step 3    Step 3 Standing in front of a sink, bend forward to your comfort level and tilt your head down. Keeping your mouth open without holding your breath, place cap snugly against your nasal passage and SQUEEZE BOTTLE GENTLY until the solution starts draining from the OPPOSITE nasal passage or from your mouth. Keep squeezing the bottle GENTLY until at least 1/4 to 1/2 (60 to 120 mL) of the bottle is used for a proper rinse. Do not swallow the solution.    Step 4    Blow your nose gently, without pinching your nose completely because this will apply pressure on the eardrums. If tolerable, sniff in any residual solution remaining in the nasal passage once or twice prior to blowing your nose as this may clean out the posterior nasopharyngeal area (the area at the back of your nasal passage). Some solution will reach the back of the throat, so please spit it out. To help improve drainage of any residual solution, blow your nose gently while tilting your head to the opposite side of the nasal passage that you just rinsed.    Step 5    Now repeat steps 3 & 4 for your other nasal passage.    Step 6     Air dry the Sinus Rinse bottle, cap, and tube on a clean paper towel, a glass plate to store the bottle cap and tube. If there is any solution leftover, please discard it. We recommend you make a fresh solution each time you rinse. Rinse 5 times each day, OR as directed by your physician. Warnings: DO NOT RINSE IF NASAL PASSAGE IS COMPLETELY BLOCKED OR IF YOU HAVE AN EAR INFECTION OR BLOCKED EARS. If you have had ear surgery, please contact your physician prior to irrigation. If you experience any pressure in your ears, stop the  rinse and get further directions from your physician or contact our office during regular business hours. To avoid any ear discomfort: Heat the solution to lukewarm, do not use hot, boiling or cold water. Keep your mouth open. Do not hold your breath and if possible make the sound FRANNIE....FRANNIE... Make sure to take the position as shown. Gently squeeze 1/4 of the bottle at a time (60mL / 2 ounces). Stop the rinse if you feel any solution sensation near the ears. You may rinse with a partially blocked nasal passage. Please do not use for any other purposes. Please rinse at least ONE HOUR PRIOR to bedtime, in order to avoid any residual solution dripping in the throat.    >> Before using the SINUS RINSE kit, please inspect the cap, tube and bottle carefully for wear and tear. If any of the components appear discolored or cracked, please contact [employer] to obtain a replacement. You must follow the cleaning instructions provided in this brochure or cleaning instruction card prior to each use.    >> The SINUS RINSE bottle and mixture are to be used only for nasalirrigation. Do not use for any other purposes.    >> We recommend that you use the rinse ONE HOUR PRIOR to bedtime in order to avoid any residual solution dripping in the throat.    Tips to avoid ear discomfort while rinsing    If you have had ear surgery, please contact your physician prior to irrigation. Do not use if you have an ear infection or blocked ears. Rinse with lukewarm water. Keep your mouth open. Do not hold your breath while rinsing. While rinsing, make sure to tilt your. Gently squeeze the bottle while rinsing; do not squeeze the bottle very forcefully. Stop the rinse if you feel a sensation of fluid near your ears.    Tips to avoid unexpected drainage after rinsing    In rare situations, especially if you have had sinus surgery, the saline solution can pool in the sinus cavities and nasal passages and then drip from your nostrils hours after  rinsing. To avoid this harmless but annoying inconvenience, take one extra step after rinsing: lean forward, tilt your head sideways and gently blow your nose. Then, tilt your head to the other side and blow again. You may need to repeat this several times. This will help rid your nasal passages of any excess mucus and remaining saline solution. If you find yourself experiencing delayed drainage often, do not rinse right before leaving your house or going to bed.

## 2022-11-18 NOTE — PROCEDURES
Laryngoscopy    Date/Time: 11/18/2022 1:00 PM  Performed by: Colby Daniels PA-C  Authorized by: Colby Daniels PA-C     Consent Done?:  Yes (Verbal)  Anesthesia:     Local anesthetic:  Lidocaine 4% and Bong-Synephrine 1/2%    Patient tolerance:  Patient tolerated the procedure well with no immediate complications    Decongestion performed?: Yes    Laryngoscopy:     Areas examined:  Nasal cavities, nasopharynx, oropharynx, hypopharynx, larynx and vocal cords    Laryngoscope size: flexible disposable storz scope.  Nose External:      No external nasal deformity  Nose Intranasal:      Mucosa no polyps     Mucosa ulcers not present     No mucosa lesions present     Septum gross deformity     Turbinates not enlarged  Nasopharynx:      No mucosa lesions     Posterior choanae patent     Eustachian tube patent  Larynx/hypopharynx:      No epiglottis lesions     No epiglottis edema     No AE folds lesions     No vocal cord polyps     Equal and normal bilateral     No hypopharynx lesions     No piriform sinus pooling     No piriform sinus lesions     No post cricoid edema     No post cricoid erythema

## 2023-06-08 DIAGNOSIS — R09.82 POST-NASAL DRIP: ICD-10-CM

## 2023-06-08 DIAGNOSIS — R09.81 NASAL CONGESTION: ICD-10-CM

## 2023-06-08 RX ORDER — AZELASTINE 1 MG/ML
SPRAY, METERED NASAL
Qty: 90 ML | Refills: 0 | Status: SHIPPED | OUTPATIENT
Start: 2023-06-08 | End: 2023-09-05

## 2023-09-04 DIAGNOSIS — R09.82 POST-NASAL DRIP: ICD-10-CM

## 2023-09-04 DIAGNOSIS — R09.81 NASAL CONGESTION: ICD-10-CM

## 2023-09-05 RX ORDER — AZELASTINE 1 MG/ML
SPRAY, METERED NASAL
Qty: 90 ML | Refills: 0 | Status: SHIPPED | OUTPATIENT
Start: 2023-09-05